# Patient Record
Sex: FEMALE | Race: WHITE | NOT HISPANIC OR LATINO | Employment: FULL TIME | URBAN - METROPOLITAN AREA
[De-identification: names, ages, dates, MRNs, and addresses within clinical notes are randomized per-mention and may not be internally consistent; named-entity substitution may affect disease eponyms.]

---

## 2021-12-16 ENCOUNTER — OFFICE VISIT (OUTPATIENT)
Dept: OBSTETRICS AND GYNECOLOGY | Facility: CLINIC | Age: 36
End: 2021-12-16

## 2021-12-16 VITALS
WEIGHT: 158 LBS | HEIGHT: 64 IN | BODY MASS INDEX: 26.98 KG/M2 | SYSTOLIC BLOOD PRESSURE: 110 MMHG | HEART RATE: 71 BPM | DIASTOLIC BLOOD PRESSURE: 65 MMHG

## 2021-12-16 DIAGNOSIS — Z30.432 ENCOUNTER FOR REMOVAL OF INTRAUTERINE CONTRACEPTIVE DEVICE (IUD): Primary | ICD-10-CM

## 2021-12-16 PROCEDURE — 58301 REMOVE INTRAUTERINE DEVICE: CPT | Performed by: NURSE PRACTITIONER

## 2021-12-16 NOTE — PROGRESS NOTES
IUD Removal Procedure Note    Procedures    Type of IUD:  Mirena   Date of insertion:  unknown  Reason for removal:  Desires pregnancy    Procedure Time Out Documentation    Procedure Details  IUD strings visible:  no  Removal:  Attempted to visualize strings and removed from cervix with cotton swabs unsuccessfully, attempted to grasp strings with Jackie forceps x4.  Unable to grasp strings.  Discussed with patient will need to see physician to facilitate IUD removal.  Patient verbalizes understanding and is agreeable.    All appropriate instructions regarding removal were reviewed.    Tolerated well  No apparent complications  Post procedure diagnosis : Failed IUD removal     Plans for contraception:  no method    The patient was advised to call for any fever or for prolonged or severe pain or bleeding. She was advised to use motrin as needed for mild to moderate pain.     Grant Vallejo, APRN  12/16/2021

## 2022-01-11 ENCOUNTER — OFFICE VISIT (OUTPATIENT)
Dept: OBSTETRICS AND GYNECOLOGY | Facility: CLINIC | Age: 37
End: 2022-01-11

## 2022-01-11 VITALS
WEIGHT: 158.6 LBS | SYSTOLIC BLOOD PRESSURE: 116 MMHG | BODY MASS INDEX: 27.08 KG/M2 | HEART RATE: 104 BPM | DIASTOLIC BLOOD PRESSURE: 74 MMHG | HEIGHT: 64 IN

## 2022-01-11 DIAGNOSIS — Z30.432 ENCOUNTER FOR IUD REMOVAL: Primary | ICD-10-CM

## 2022-01-11 PROCEDURE — 58301 REMOVE INTRAUTERINE DEVICE: CPT | Performed by: OBSTETRICS & GYNECOLOGY

## 2022-01-11 RX ORDER — LORATADINE 10 MG/1
10 CAPSULE, LIQUID FILLED ORAL
COMMUNITY
End: 2022-11-15

## 2022-01-11 NOTE — ASSESSMENT & PLAN NOTE
Tylenol or Motrin for pelvic cramping  Return warnings given  Recommend prenatal vitamin with multivitamin folic acid if not preventing pregnancy

## 2022-01-11 NOTE — PROGRESS NOTES
"IUD Removal      CC: Presents for IUD removal     I reviewed the procedure in detail.  She understand the potential risks include, but are not limited to, pain, bleeding, and infection.  Her questions have been answered.    Subjective:  Desires IUD removal.  Provider at her last office visit was unable to retrieve the IUD.  No concerns.  Does not desire any further birth control.    Objective:  /74   Pulse 104   Ht 162.6 cm (64\")   Wt 71.9 kg (158 lb 9.6 oz)   LMP 01/11/2022   Breastfeeding No   BMI 27.22 kg/m²     Physical Exam  Vitals and nursing note reviewed. Exam conducted with a chaperone present.   Constitutional:       General: She is not in acute distress.     Appearance: Normal appearance. She is not ill-appearing.   Abdominal:      General: Abdomen is flat. There is no distension.      Palpations: Abdomen is soft. There is no mass.      Tenderness: There is no guarding or rebound.      Hernia: No hernia is present.   Genitourinary:     General: Normal vulva.      Exam position: Lithotomy position.      Pubic Area: No rash.       Labia:         Right: No rash, tenderness, lesion or injury.         Left: No rash, tenderness, lesion or injury.       Vagina: Normal.      Cervix: Normal.      Uterus: Normal.       Comments: IUD string seen just inside the external cervical os.  Musculoskeletal:      Right lower leg: No edema.      Left lower leg: No edema.   Neurological:      Mental Status: She is alert and oriented to person, place, and time.   Psychiatric:         Mood and Affect: Mood normal.         Behavior: Behavior normal.         Thought Content: Thought content normal.         IUD REMOVAL: Strings visualized, IUD removed intact.  Discarded.    ASSESSMENT AND PLAN:    Diagnoses and all orders for this visit:    1. Encounter for IUD removal (Primary)  Assessment & Plan:  Tylenol or Motrin for pelvic cramping  Return warnings given  Recommend prenatal vitamin with multivitamin folic acid if " not preventing pregnancy          Counseling:  She understand she can become pregnant immediately.  I recommend she keep track of menses and RTO if <q21d, >7d long, heavy or painful.    R/B/A/SE/efficacy of all BC options reviewed with respect to her individual medical history.   She has decided on None for BC.  If she desires pregnancy I have encouraged a healthy lifestyle and PNV.     PRECAUTIONS - She was advised to watch for fever, chills, vaginal discharge or odor.      Follow Up:  Return if symptoms worsen or fail to improve.        Dragan Jones MD  01/11/2022

## 2022-03-30 ENCOUNTER — INITIAL PRENATAL (OUTPATIENT)
Dept: OBSTETRICS AND GYNECOLOGY | Facility: CLINIC | Age: 37
End: 2022-03-30

## 2022-03-30 VITALS — SYSTOLIC BLOOD PRESSURE: 114 MMHG | BODY MASS INDEX: 27.81 KG/M2 | DIASTOLIC BLOOD PRESSURE: 82 MMHG | WEIGHT: 162 LBS

## 2022-03-30 DIAGNOSIS — Z32.01 PREGNANCY TEST PERFORMED, PREGNANCY CONFIRMED: Primary | ICD-10-CM

## 2022-03-30 DIAGNOSIS — Z34.80 SUPERVISION OF OTHER NORMAL PREGNANCY: ICD-10-CM

## 2022-03-30 PROBLEM — Z30.432 ENCOUNTER FOR IUD REMOVAL: Status: RESOLVED | Noted: 2022-01-11 | Resolved: 2022-03-30

## 2022-03-30 PROBLEM — Z98.891 HX OF CESAREAN SECTION: Status: ACTIVE | Noted: 2022-03-30

## 2022-03-30 PROBLEM — O09.521 MULTIGRAVIDA OF ADVANCED MATERNAL AGE IN FIRST TRIMESTER: Status: ACTIVE | Noted: 2022-03-30

## 2022-03-30 LAB
ABO GROUP BLD: NORMAL
B-HCG UR QL: POSITIVE
BASOPHILS # BLD AUTO: 0.02 10*3/MM3 (ref 0–0.2)
BASOPHILS NFR BLD AUTO: 0.2 % (ref 0–1.5)
BLD GP AB SCN SERPL QL: NEGATIVE
C TRACH RRNA CVX QL NAA+PROBE: NOT DETECTED
DEPRECATED RDW RBC AUTO: 44.8 FL (ref 37–54)
EOSINOPHIL # BLD AUTO: 0.09 10*3/MM3 (ref 0–0.4)
EOSINOPHIL NFR BLD AUTO: 1 % (ref 0.3–6.2)
ERYTHROCYTE [DISTWIDTH] IN BLOOD BY AUTOMATED COUNT: 12.2 % (ref 12.3–15.4)
EXPIRATION DATE: ABNORMAL
GLUCOSE UR STRIP-MCNC: NEGATIVE MG/DL
HBV SURFACE AG SERPL QL IA: NORMAL
HCT VFR BLD AUTO: 40.3 % (ref 34–46.6)
HCV AB SER DONR QL: NORMAL
HGB BLD-MCNC: 13.8 G/DL (ref 12–15.9)
HIV1+2 AB SER QL: NORMAL
IMM GRANULOCYTES # BLD AUTO: 0.04 10*3/MM3 (ref 0–0.05)
IMM GRANULOCYTES NFR BLD AUTO: 0.4 % (ref 0–0.5)
INTERNAL NEGATIVE CONTROL: ABNORMAL
INTERNAL POSITIVE CONTROL: ABNORMAL
LYMPHOCYTES # BLD AUTO: 1.95 10*3/MM3 (ref 0.7–3.1)
LYMPHOCYTES NFR BLD AUTO: 21.2 % (ref 19.6–45.3)
Lab: ABNORMAL
MCH RBC QN AUTO: 33.8 PG (ref 26.6–33)
MCHC RBC AUTO-ENTMCNC: 34.2 G/DL (ref 31.5–35.7)
MCV RBC AUTO: 98.8 FL (ref 79–97)
MONOCYTES # BLD AUTO: 0.48 10*3/MM3 (ref 0.1–0.9)
MONOCYTES NFR BLD AUTO: 5.2 % (ref 5–12)
N GONORRHOEA RRNA SPEC QL NAA+PROBE: NOT DETECTED
NEUTROPHILS NFR BLD AUTO: 6.6 10*3/MM3 (ref 1.7–7)
NEUTROPHILS NFR BLD AUTO: 72 % (ref 42.7–76)
NRBC BLD AUTO-RTO: 0 /100 WBC (ref 0–0.2)
PLATELET # BLD AUTO: 323 10*3/MM3 (ref 140–450)
PMV BLD AUTO: 9.6 FL (ref 6–12)
PROT UR STRIP-MCNC: NEGATIVE MG/DL
RBC # BLD AUTO: 4.08 10*6/MM3 (ref 3.77–5.28)
RH BLD: POSITIVE
WBC NRBC COR # BLD: 9.18 10*3/MM3 (ref 3.4–10.8)

## 2022-03-30 PROCEDURE — 0501F PRENATAL FLOW SHEET: CPT | Performed by: STUDENT IN AN ORGANIZED HEALTH CARE EDUCATION/TRAINING PROGRAM

## 2022-03-30 PROCEDURE — 86803 HEPATITIS C AB TEST: CPT | Performed by: STUDENT IN AN ORGANIZED HEALTH CARE EDUCATION/TRAINING PROGRAM

## 2022-03-30 PROCEDURE — 87591 N.GONORRHOEAE DNA AMP PROB: CPT | Performed by: STUDENT IN AN ORGANIZED HEALTH CARE EDUCATION/TRAINING PROGRAM

## 2022-03-30 PROCEDURE — 87086 URINE CULTURE/COLONY COUNT: CPT | Performed by: STUDENT IN AN ORGANIZED HEALTH CARE EDUCATION/TRAINING PROGRAM

## 2022-03-30 PROCEDURE — 87491 CHLMYD TRACH DNA AMP PROBE: CPT | Performed by: STUDENT IN AN ORGANIZED HEALTH CARE EDUCATION/TRAINING PROGRAM

## 2022-03-30 PROCEDURE — G0432 EIA HIV-1/HIV-2 SCREEN: HCPCS | Performed by: STUDENT IN AN ORGANIZED HEALTH CARE EDUCATION/TRAINING PROGRAM

## 2022-03-30 PROCEDURE — 81025 URINE PREGNANCY TEST: CPT | Performed by: STUDENT IN AN ORGANIZED HEALTH CARE EDUCATION/TRAINING PROGRAM

## 2022-03-30 PROCEDURE — 87624 HPV HI-RISK TYP POOLED RSLT: CPT | Performed by: STUDENT IN AN ORGANIZED HEALTH CARE EDUCATION/TRAINING PROGRAM

## 2022-03-30 PROCEDURE — 83020 HEMOGLOBIN ELECTROPHORESIS: CPT | Performed by: STUDENT IN AN ORGANIZED HEALTH CARE EDUCATION/TRAINING PROGRAM

## 2022-03-30 PROCEDURE — G0123 SCREEN CERV/VAG THIN LAYER: HCPCS | Performed by: STUDENT IN AN ORGANIZED HEALTH CARE EDUCATION/TRAINING PROGRAM

## 2022-03-30 RX ORDER — PNV NO.95/FERROUS FUM/FOLIC AC 28MG-0.8MG
1 TABLET ORAL DAILY
Qty: 30 TABLET | Refills: 11 | Status: SHIPPED | OUTPATIENT
Start: 2022-03-30

## 2022-03-30 NOTE — PROGRESS NOTES
OB Initial Visit    CC- Here for care of current pregnancy     KAMARI Finalized: Unable to finalize dates, needs dating U/S    Subjective:  36 y.o.  presenting for her first obstetrical visit.    LMP: Patient's last menstrual period was 2022. unsure    COMPLAINS OF: fatigue    Lissa Guardado is a 36 y.o.  11w1d first prenatal appointment. She denies any vaginal bleeding, reports some occassional L sided tenderness. Breast, morning sickness has resolved.  No other acute complaints. No significant medical history. History of abnormal pap smear; no surgery;  Hx C/S fetal intolerance. No STI hx. Taking Prenatal vitamin       Reviewed and updated:  OBHx, GYNHx (STDs), PMHx, Medications, Allergies, PSHx, Social Hx, Preventative Hx (PAP), Hx of abuse/safe environment, Vaccine Hx including hx of chickenpox or vaccine, Genetic Hx (pt, FOB, both families).        Objective:  /82   Wt 73.5 kg (162 lb)   LMP 2022   BMI 27.81 kg/m²   General- NAD, alert and oriented, appropriate  Psych- Normal mood, good memory  Neck- No masses, no thyroid enlargement  CV- Regular rhythm, no murnurs  Resp- CTA to bases, no wheezes  Abdomen- Soft, non distended, non tender, no masses, well healed pfannenstiel incision   External genitalia- Normal, no lesions  Urethra- Normal, no masses, non tender  Vagina- Normal, no discharge  Bladder- Normal, no masses, non tender  Cvx- Normal, no lesions, no discharge, no CMT  Uterus- Normal shape and consistency, non tender  Adnexa- Normal, no mass, non tender    Lymphatic- No palpable neck, axillary, or groin nodes  Ext- No edema, no cyanosis    Skin- No lesions, no rashes, no acanthosis nigricans      Assessment and Plan:  Diagnoses and all orders for this visit:    1. Pregnancy test performed, pregnancy confirmed (Primary)  -     POC Pregnancy, Urine    2. Supervision of other normal pregnancy  -     POC Urinalysis Dipstick  -     Urine Culture - Urine, Urine, Clean Catch  -      IgP, Aptima HPV  -     OB Panel With HIV; Future  -     Hemoglobinopathy Fractionation Cascade; Future  -     Chlamydia trachomatis, Neisseria gonorrhoeae, PCR - Swab, Cervix  -     US Ob < 14 Weeks Single or First Gestation; Future  -     Prenatal Vit-Fe Fumarate-FA (Prenatal Vitamin) 27-0.8 MG tablet; Take 1 tablet by mouth Daily.  Dispense: 30 tablet; Refill: 11            11w1d    Genetic Screening: Considering   CF  NIPS    Vaccines: Recommend COVID vaccine, R/B discussed    Counseling: Nutrition discussed, calories, activity/exercise in pregnancy  Discussed dietary restrictions/safety food preparation in pregnancy  Reviewed what to expect prenatal visits, office providers  Appropriate trimester precautions provided, N/V, vag bleeding, cramping  Questions answered    Labs: Prenatal labs, cultures, and PAP performed (prn)    Return in about 4 weeks (around 4/27/2022) for Next scheduled follow up.      Wali Flowers MD  03/30/2022

## 2022-03-31 LAB
BACTERIA SPEC AEROBE CULT: NO GROWTH
RPR SER QL: NORMAL

## 2022-04-01 LAB
HGB A MFR BLD ELPH: 95.9 % (ref 96.4–98.8)
HGB A2 MFR BLD ELPH: 2.6 % (ref 1.8–3.2)
HGB F MFR BLD ELPH: 1.5 % (ref 0–2)
HGB FRACT BLD-IMP: ABNORMAL
HGB S MFR BLD ELPH: 0 %
RUBV IGG SERPL IA-ACNC: 4.96 INDEX

## 2022-04-06 LAB
CYTOLOGIST CVX/VAG CYTO: ABNORMAL
CYTOLOGY CVX/VAG DOC CYTO: ABNORMAL
CYTOLOGY CVX/VAG DOC THIN PREP: ABNORMAL
DX ICD CODE: ABNORMAL
HIV 1 & 2 AB SER-IMP: ABNORMAL
HPV I/H RISK 4 DNA CVX QL PROBE+SIG AMP: POSITIVE
OTHER STN SPEC: ABNORMAL
STAT OF ADQ CVX/VAG CYTO-IMP: ABNORMAL

## 2022-04-12 ENCOUNTER — TELEPHONE (OUTPATIENT)
Dept: OBSTETRICS AND GYNECOLOGY | Facility: CLINIC | Age: 37
End: 2022-04-12

## 2022-04-12 NOTE — TELEPHONE ENCOUNTER
Pt called stating @ initial ob appt it was discussed that the NIPS testing would be recommended due to her being AMA. [facility] will not allow her to get the NIPS testing with out a preauthorization, which she understands we do not do those,she is wanting to know if we can send over a letter to her PCP on [place] stating that the NIPS testing would be recommended due to her being AMA.   Please advise.

## 2022-04-13 NOTE — TELEPHONE ENCOUNTER
Yes a letter can be sent to PCP stating that patient is at increased risk for aneuploidy based on age > 35.

## 2022-04-28 ENCOUNTER — ROUTINE PRENATAL (OUTPATIENT)
Dept: OBSTETRICS AND GYNECOLOGY | Facility: CLINIC | Age: 37
End: 2022-04-28

## 2022-04-28 VITALS — BODY MASS INDEX: 28.15 KG/M2 | WEIGHT: 164 LBS | DIASTOLIC BLOOD PRESSURE: 63 MMHG | SYSTOLIC BLOOD PRESSURE: 120 MMHG

## 2022-04-28 DIAGNOSIS — O09.521 MULTIGRAVIDA OF ADVANCED MATERNAL AGE IN FIRST TRIMESTER: ICD-10-CM

## 2022-04-28 DIAGNOSIS — Z34.80 SUPERVISION OF OTHER NORMAL PREGNANCY, ANTEPARTUM: ICD-10-CM

## 2022-04-28 DIAGNOSIS — Z98.891 HX OF CESAREAN SECTION: Primary | ICD-10-CM

## 2022-04-28 DIAGNOSIS — Z3A.15 15 WEEKS GESTATION OF PREGNANCY: ICD-10-CM

## 2022-04-28 LAB
GLUCOSE UR STRIP-MCNC: NEGATIVE MG/DL
LEUKOCYTE EST, POC: NEGATIVE
NITRITE UR-MCNC: NEGATIVE MG/ML
PROT UR STRIP-MCNC: NEGATIVE MG/DL

## 2022-04-28 PROCEDURE — 82105 ALPHA-FETOPROTEIN SERUM: CPT | Performed by: NURSE PRACTITIONER

## 2022-04-28 PROCEDURE — 0502F SUBSEQUENT PRENATAL CARE: CPT | Performed by: NURSE PRACTITIONER

## 2022-04-28 PROCEDURE — 36415 COLL VENOUS BLD VENIPUNCTURE: CPT | Performed by: NURSE PRACTITIONER

## 2022-04-28 NOTE — PROGRESS NOTES
OB FOLLOW UP        Chief Complaint   Patient presents with   • Routine Prenatal Visit     15 week pregnancy follow up        Subjective:   • No complaints  • Still has concerns about continued pregnancy viability due to hx of miscarriage    Objective:  /63   Wt 74.4 kg (164 lb)   LMP 2022   BMI 28.15 kg/m²   Uterine Size: size equals dates, below umbilicus  FHT: 110-160 BPM    See OB flow for LE edema, cvx exam if performed, and Upro/Uglu    Assessment and Plan:  15w2d  Reassuring pregnancy progress.  Questions answered.  Diagnoses and all orders for this visit:    1. Hx of  section (Primary)  Overview:  3/30/2022 Fetal intolerance       2. Supervision of other normal pregnancy, antepartum  Overview:  Initial visit:  3/30/2022   • PNL:  • PAP/GC/CT/Urine culture:  • Blood type:  • Hx of HSV?:  No     Genetic testing:    Considering CF and NIPS     Vaccinations:  • COVID: 3/30/2022  Vaccinated and boosted      24-28 weeks:  • 1hr GCT:  • Hct/Plt:  • Tdap Rx  • Rhogam indicated:      Third Trimester:  • GBS  • Breast pump:    Ultrasound:  • Dating:  3/30/2022   • Anatomy:   • Follow up:     PLAN:          Orders:  -     POC Urinalysis Dipstick  -     INVITAE NIPS  -     Alpha Fetoprotein, Maternal  -     US Ob Detail Fetal Anatomy Single or First Gestation; Future    3. Multigravida of advanced maternal age in first trimester    4. 15 weeks gestation of pregnancy    Counseling:    • Second trimester precautions  • encouraged to seek counseling/therapy for continued anxiety, patient will consider  • Continue PNV.  Importance of healthy eating and exercise.    Return in about 4 weeks (around 2022) for OB follow up.            Grant Vallejo, APRN  2022    [facility]  [facility]  [address] [address]  Dept: [phone]  Loc: [phone]

## 2022-05-05 ENCOUNTER — TELEPHONE (OUTPATIENT)
Dept: OBSTETRICS AND GYNECOLOGY | Facility: CLINIC | Age: 37
End: 2022-05-05

## 2022-05-05 NOTE — TELEPHONE ENCOUNTER
Discussed results with patient. Patient has a log in to nips portal and will access that with  for gender results.

## 2022-05-06 LAB
AFP INTERP SERPL-IMP: NORMAL
AFP INTERP SERPL-IMP: NORMAL
AFP MOM SERPL: 1.27
AFP SERPL-MCNC: 38 NG/ML
AGE AT DELIVERY: 38.4 YR
GA METHOD: NORMAL
GA: 15.3 WEEKS
IDDM PATIENT QL: NO
LABORATORY COMMENT REPORT: NORMAL
MULTIPLE PREGNANCY: NO
NEURAL TUBE DEFECT RISK FETUS: 5251 %
RESULT: NORMAL

## 2022-05-31 ENCOUNTER — HOSPITAL ENCOUNTER (OUTPATIENT)
Dept: ULTRASOUND IMAGING | Facility: HOSPITAL | Age: 37
Discharge: HOME | End: 2022-05-31
Admitting: NURSE PRACTITIONER

## 2022-05-31 DIAGNOSIS — Z34.80 SUPERVISION OF OTHER NORMAL PREGNANCY, ANTEPARTUM: ICD-10-CM

## 2022-05-31 PROCEDURE — 76811 OB US DETAILED SNGL FETUS: CPT

## 2022-06-03 ENCOUNTER — ROUTINE PRENATAL (OUTPATIENT)
Dept: OBSTETRICS AND GYNECOLOGY | Facility: CLINIC | Age: 37
End: 2022-06-03

## 2022-06-03 VITALS — WEIGHT: 172 LBS | SYSTOLIC BLOOD PRESSURE: 119 MMHG | BODY MASS INDEX: 29.52 KG/M2 | DIASTOLIC BLOOD PRESSURE: 74 MMHG

## 2022-06-03 DIAGNOSIS — Z34.80 SUPERVISION OF OTHER NORMAL PREGNANCY, ANTEPARTUM: Primary | ICD-10-CM

## 2022-06-03 DIAGNOSIS — O09.521 MULTIGRAVIDA OF ADVANCED MATERNAL AGE IN FIRST TRIMESTER: ICD-10-CM

## 2022-06-03 DIAGNOSIS — Z98.891 HX OF CESAREAN SECTION: ICD-10-CM

## 2022-06-03 PROBLEM — U07.1 COVID-19 VIRUS DETECTED: Status: ACTIVE | Noted: 2022-06-03

## 2022-06-03 LAB
GLUCOSE UR STRIP-MCNC: NEGATIVE MG/DL
PROT UR STRIP-MCNC: NEGATIVE MG/DL

## 2022-06-03 PROCEDURE — 0502F SUBSEQUENT PRENATAL CARE: CPT | Performed by: STUDENT IN AN ORGANIZED HEALTH CARE EDUCATION/TRAINING PROGRAM

## 2022-06-03 RX ORDER — ASPIRIN 81 MG/1
81 TABLET, CHEWABLE ORAL DAILY
Status: ON HOLD | COMMUNITY
End: 2022-10-12

## 2022-06-03 NOTE — PROGRESS NOTES
OB FOLLOW UP  Complaint   Chief Complaint   Patient presents with   • Routine Prenatal Visit            Lissa Guardado is a 37 y.o.  20w3d patient being seen today for her obstetrical follow up visit. Patient denies decreased fetal movement, contractions, loss of fluid or vaginal bleeding.  Did get diagnosed with COVID in middle of May.  Had sinus-like infection.  Reports no concerns today.  Has started taking baby aspirin.    Her prenatal care is complicated by (and status) :    Patient Active Problem List   Diagnosis   • Hx of  section   • Supervision of other normal pregnancy, antepartum   • Multigravida of advanced maternal age in first trimester   • COVID-19 virus detected       All other systems reviewed and are negative.     The additional following portions of the patient's history were reviewed and updated as appropriate: allergies, current medications, past family history, past medical history, past social history, past surgical history and problem list.      EXAM:     Vital signs: /74   Wt 78 kg (172 lb)   LMP 2022   BMI 29.52 kg/m²   Appearance/psychiatric: To be in no distress  Constitutional: The patient is well nourished.  Cardiovascular: She does not have edema.  Respiratory: Respiratory effort is normal.  Gastrointestinal: Abdomen is soft, gravid, nontender, no rashes, heart tones are present, fundal height is size equals dates    Pelvic Exam: No    Urine glucose/protein: See prenatal flowsheet       Assessment and Plan    Problem List Items Addressed This Visit        Gravid and     Hx of  section    Overview     3/30/2022 Fetal intolerance            Supervision of other normal pregnancy, antepartum - Primary    Overview     Initial visit:  3/30/2022   • PNL:  • PAP/GC/CT/Urine culture:  • Blood type:  • Hx of HSV?:  No     Genetic testing:    Considering CF and NIPS   AFP negative    Vaccinations:  • COVID: 3/30/2022  Vaccinated and boosted      24-28  weeks:  • 1hr GCT:  • Hct/Plt:  • Tdap Rx      Third Trimester:  • GBS  • Breast pump:    Ultrasound:  • Dating:  3/30/2022   • Anatomy: 5/31/22 normal anatomy, posterior placenta  • Follow up:     PLAN:                 Relevant Orders    POC Urinalysis Dipstick (Completed)    Multigravida of advanced maternal age in first trimester          Impression  1. Pregnancy at 20w3d  2. Fetal status reassuring.   3. Activity and Exercise discussed.    Plan  1.  Continue with daily aspirin for history of COVID.  Discussed NSTs at 30 to 34 weeks for history of COVID as well as advanced maternal age  2.  Glucose tolerance testing hemoglobin Macker to next appointment in 4 weeks  3.  Follow-up 4 weeks      Patient was counseled to the following pregnancy precautions:  • Decreased fetal movement, if concern for decreased fetal movement please perform fetal kick counts you are looking for 10 movements in 2 hours.  If concern for fetal movement and not meeting that criteria, please present to triage for evaluation.  • Contractions occurring every 5 minutes for over an hour, lasting 30 to 60 seconds and progressively causing more discomfort, please seek medical attention to rule out labor  • If you believe that your water is broken, place a sanitary pad.  If pad fills in short period of time i.e. less than 5 minutes, take off pad placed another pad.  If this is saturated please present for rule out rupture of membranes  • Vaginal bleeding can be normal in pregnancy, this usually takes a form of spotting.  If having heavier bleeding like a menstrual period please present for evaluation; especially in light of severe abdominal pain this could represent a placental abruption.  • Keep all scheduled appointments as recommended.        Wali Flowers MD  06/03/2022

## 2022-06-30 ENCOUNTER — ROUTINE PRENATAL (OUTPATIENT)
Dept: OBSTETRICS AND GYNECOLOGY | Facility: CLINIC | Age: 37
End: 2022-06-30

## 2022-06-30 VITALS — SYSTOLIC BLOOD PRESSURE: 111 MMHG | WEIGHT: 175 LBS | BODY MASS INDEX: 30.04 KG/M2 | DIASTOLIC BLOOD PRESSURE: 69 MMHG

## 2022-06-30 DIAGNOSIS — Z34.80 SUPERVISION OF OTHER NORMAL PREGNANCY, ANTEPARTUM: Primary | ICD-10-CM

## 2022-06-30 DIAGNOSIS — O09.522 MULTIGRAVIDA OF ADVANCED MATERNAL AGE IN SECOND TRIMESTER: ICD-10-CM

## 2022-06-30 DIAGNOSIS — Z98.891 HX OF CESAREAN SECTION: ICD-10-CM

## 2022-06-30 DIAGNOSIS — U07.1 COVID-19 VIRUS DETECTED: ICD-10-CM

## 2022-06-30 LAB
GLUCOSE 1H P GLC SERPL-MCNC: 130 MG/DL (ref 65–139)
GLUCOSE UR STRIP-MCNC: NEGATIVE MG/DL
PROT UR STRIP-MCNC: NEGATIVE MG/DL

## 2022-06-30 PROCEDURE — 85027 COMPLETE CBC AUTOMATED: CPT | Performed by: STUDENT IN AN ORGANIZED HEALTH CARE EDUCATION/TRAINING PROGRAM

## 2022-06-30 PROCEDURE — 0502F SUBSEQUENT PRENATAL CARE: CPT | Performed by: STUDENT IN AN ORGANIZED HEALTH CARE EDUCATION/TRAINING PROGRAM

## 2022-06-30 PROCEDURE — 82950 GLUCOSE TEST: CPT | Performed by: STUDENT IN AN ORGANIZED HEALTH CARE EDUCATION/TRAINING PROGRAM

## 2022-06-30 NOTE — PROGRESS NOTES
OB FOLLOW UP  Complaint   Chief Complaint   Patient presents with   • Routine Prenatal Visit            Lissa Guardado is a 37 y.o.  24w2d patient being seen today for her obstetrical follow up visit. Patient denies decreased fetal movement, contractions, loss of fluid or vaginal bleeding.  No acute complaints.  Compliant with prenatal vitamin and baby aspirin.     Her prenatal care is complicated by (and status) :    Patient Active Problem List   Diagnosis   • Hx of  section   • Supervision of other normal pregnancy, antepartum   • Multigravida of advanced maternal age in second trimester   • COVID-19 virus detected       All other systems reviewed and are negative.     The additional following portions of the patient's history were reviewed and updated as appropriate: allergies, current medications, past family history, past medical history, past social history, past surgical history and problem list.      EXAM:     Vital signs: /69   Wt 79.4 kg (175 lb)   LMP 2022   BMI 30.04 kg/m²   Appearance/psychiatric: To be in no distress  Constitutional: The patient is well nourished.  Cardiovascular: She does not have edema.  Respiratory: Respiratory effort is normal.  Gastrointestinal: Abdomen is soft, gravid, nontender, no rashes, heart tones are present, fundal height is size equals dates    Pelvic Exam: No    Urine glucose/protein: See prenatal flowsheet       Assessment and Plan    Problem List Items Addressed This Visit        Gravid and     Hx of  section    Overview     3/30/2022 Fetal intolerance            Supervision of other normal pregnancy, antepartum - Primary    Overview     Initial visit:  3/30/2022   • PNL:  • PAP/GC/CT/Urine culture:  • Blood type:  • Hx of HSV?:  No     Genetic testing:    Considering CF and NIPS   AFP negative    Vaccinations:  • COVID: 3/30/2022  Vaccinated and boosted      24-28 weeks:  • 1hr GCT: 2022   • Hct/Plt: 2022   • Tdap  Rx      Third Trimester:  • GBS  • Breast pump:    Ultrasound:  • Dating:  3/30/2022   • Anatomy: 5/31/22 normal anatomy, posterior placenta  • Follow up:     PLAN:                 Relevant Orders    Gestational Diabetes Screen 1 Hour    CBC (No Diff)    POC Urinalysis Dipstick (Completed)    Multigravida of advanced maternal age in second trimester       Other    COVID-19 virus detected    Overview     6/3/2022 ASA                  Impression  1. Pregnancy at 24w2d  2. Fetal status reassuring.   3. Activity and Exercise discussed.    Plan  1.  Glucose tolerance testing and CBC today  2.  Continue with baby aspirin prenatal vitamin  3.  Follow-up 4 weeks      Patient was counseled to the following pregnancy precautions:  • Decreased fetal movement, if concern for decreased fetal movement please perform fetal kick counts you are looking for 10 movements in 2 hours.  If concern for fetal movement and not meeting that criteria, please present to triage for evaluation.  • Contractions occurring every 5 minutes for over an hour, lasting 30 to 60 seconds and progressively causing more discomfort, please seek medical attention to rule out labor  • If you believe that your water is broken, place a sanitary pad.  If pad fills in short period of time i.e. less than 5 minutes, take off pad placed another pad.  If this is saturated please present for rule out rupture of membranes  • Vaginal bleeding can be normal in pregnancy, this usually takes a form of spotting.  If having heavier bleeding like a menstrual period please present for evaluation; especially in light of severe abdominal pain this could represent a placental abruption.  • Keep all scheduled appointments as recommended.        Wali Flowers MD  06/30/2022

## 2022-07-01 ENCOUNTER — TELEPHONE (OUTPATIENT)
Dept: OBSTETRICS AND GYNECOLOGY | Facility: CLINIC | Age: 37
End: 2022-07-01

## 2022-07-01 LAB
DEPRECATED RDW RBC AUTO: 45.2 FL (ref 37–54)
ERYTHROCYTE [DISTWIDTH] IN BLOOD BY AUTOMATED COUNT: 12.2 % (ref 12.3–15.4)
HCT VFR BLD AUTO: 36 % (ref 34–46.6)
HGB BLD-MCNC: 12 G/DL (ref 12–15.9)
MCH RBC QN AUTO: 33.6 PG (ref 26.6–33)
MCHC RBC AUTO-ENTMCNC: 33.3 G/DL (ref 31.5–35.7)
MCV RBC AUTO: 100.8 FL (ref 79–97)
PLATELET # BLD AUTO: 283 10*3/MM3 (ref 140–450)
PMV BLD AUTO: 9.3 FL (ref 6–12)
RBC # BLD AUTO: 3.57 10*6/MM3 (ref 3.77–5.28)
WBC NRBC COR # BLD: 9.28 10*3/MM3 (ref 3.4–10.8)

## 2022-07-01 NOTE — TELEPHONE ENCOUNTER
----- Message from Wali Flowers MD sent at 7/1/2022  8:44 AM EDT -----  H&H normal; , elevated for office guidelines. Recommend 3 hour diagnostic testing.

## 2022-07-07 ENCOUNTER — LAB (OUTPATIENT)
Dept: LAB | Facility: HOSPITAL | Age: 37
End: 2022-07-07

## 2022-07-07 DIAGNOSIS — Z34.80 SUPERVISION OF OTHER NORMAL PREGNANCY, ANTEPARTUM: ICD-10-CM

## 2022-07-07 LAB
GLUCOSE BLDC GLUCOMTR-MCNC: 79 MG/DL (ref 70–99)
GLUCOSE P FAST SERPL-MCNC: 84 MG/DL (ref 65–94)
GTT GEST 2H PNL UR+SERPL: 126 MG/DL (ref 65–179)
GTT GEST 3H PNL SERPL: 135 MG/DL (ref 65–154)
GTT GEST 3H PNL SERPL: 82 MG/DL (ref 65–139)

## 2022-07-07 PROCEDURE — 36415 COLL VENOUS BLD VENIPUNCTURE: CPT

## 2022-07-07 PROCEDURE — 82952 GTT-ADDED SAMPLES: CPT

## 2022-07-07 PROCEDURE — 82951 GLUCOSE TOLERANCE TEST (GTT): CPT

## 2022-07-27 ENCOUNTER — ROUTINE PRENATAL (OUTPATIENT)
Dept: OBSTETRICS AND GYNECOLOGY | Facility: CLINIC | Age: 37
End: 2022-07-27

## 2022-07-27 VITALS — SYSTOLIC BLOOD PRESSURE: 109 MMHG | BODY MASS INDEX: 30.73 KG/M2 | WEIGHT: 179 LBS | DIASTOLIC BLOOD PRESSURE: 69 MMHG

## 2022-07-27 DIAGNOSIS — U07.1 COVID-19 VIRUS DETECTED: ICD-10-CM

## 2022-07-27 DIAGNOSIS — O09.523 MULTIGRAVIDA OF ADVANCED MATERNAL AGE IN THIRD TRIMESTER: ICD-10-CM

## 2022-07-27 DIAGNOSIS — Z34.80 SUPERVISION OF OTHER NORMAL PREGNANCY, ANTEPARTUM: ICD-10-CM

## 2022-07-27 DIAGNOSIS — Z98.891 HX OF CESAREAN SECTION: Primary | ICD-10-CM

## 2022-07-27 LAB
GLUCOSE UR STRIP-MCNC: NEGATIVE MG/DL
PROT UR STRIP-MCNC: NEGATIVE MG/DL

## 2022-07-27 PROCEDURE — 0502F SUBSEQUENT PRENATAL CARE: CPT | Performed by: STUDENT IN AN ORGANIZED HEALTH CARE EDUCATION/TRAINING PROGRAM

## 2022-07-27 NOTE — PROGRESS NOTES
OB FOLLOW UP  Complaint   Chief Complaint   Patient presents with   • Routine Prenatal Visit            Lissa Guardado is a 37 y.o.  28w1d patient being seen today for her obstetrical follow up visit. Patient denies decreased fetal movement, contractions, loss of fluid or vaginal bleeding. No acute complaints. Compliant with PNV and baby ASA    Her prenatal care is complicated by (and status) :    Patient Active Problem List   Diagnosis   • Hx of  section   • Supervision of other normal pregnancy, antepartum   • Multigravida of advanced maternal age in third trimester   • COVID-19 virus detected       All other systems reviewed and are negative.     The additional following portions of the patient's history were reviewed and updated as appropriate: allergies, current medications, past family history, past medical history, past social history, past surgical history and problem list.      EXAM:     Vital signs: /69   Wt 81.2 kg (179 lb)   LMP 2022   BMI 30.73 kg/m²   Appearance/psychiatric: To be in no distress  Constitutional: The patient is well nourished.  Cardiovascular: She does not have edema.  Respiratory: Respiratory effort is normal.  Gastrointestinal: Abdomen is soft, gravid, nontender, no rashes, heart tones are present, fundal height is size equals dates    Pelvic Exam: No    Urine glucose/protein: See prenatal flowsheet       Assessment and Plan    Problem List Items Addressed This Visit        Gravid and     Hx of  section - Primary    Overview     3/30/2022 Fetal intolerance          Supervision of other normal pregnancy, antepartum    Overview     Initial visit:  3/30/2022   • PNL:  • PAP/GC/CT/Urine culture:  • Blood type:  • Hx of HSV?:  No     Genetic testing:    Considering CF and NIPS   AFP negative    Vaccinations:  • COVID: 3/30/2022  Vaccinated and boosted      24-28 weeks:  • 1hr GCT: 2022   • Hct/Plt: 2022   • Tdap Rx      Third  Trimester:  • GBS  • Breast pump:    Ultrasound:  • Dating:  3/30/2022   • Anatomy: 5/31/22 normal anatomy, posterior placenta  • Follow up:     PLAN:               Relevant Orders    POC Urinalysis Dipstick (Completed)    Multigravida of advanced maternal age in third trimester       Other    COVID-19 virus detected    Overview     6/3/2022 ASA                Impression  1. Pregnancy at 28w1d  2. Fetal status reassuring.   3. Activity and Exercise discussed.    Plan  1. Follow up 2 weeks  2. Continue baby ASA for hx of AMA       Patient was counseled to the following pregnancy precautions:  • Decreased fetal movement, if concern for decreased fetal movement please perform fetal kick counts you are looking for 10 movements in 2 hours.  If concern for fetal movement and not meeting that criteria, please present to triage for evaluation.  • Contractions occurring every 5 minutes for over an hour, lasting 30 to 60 seconds and progressively causing more discomfort, please seek medical attention to rule out labor  • If you believe that your water is broken, place a sanitary pad.  If pad fills in short period of time i.e. less than 5 minutes, take off pad placed another pad.  If this is saturated please present for rule out rupture of membranes  • Vaginal bleeding can be normal in pregnancy, this usually takes a form of spotting.  If having heavier bleeding like a menstrual period please present for evaluation; especially in light of severe abdominal pain this could represent a placental abruption.  • Keep all scheduled appointments as recommended.        Wali Flowers MD  07/27/2022

## 2022-08-11 ENCOUNTER — ROUTINE PRENATAL (OUTPATIENT)
Dept: OBSTETRICS AND GYNECOLOGY | Facility: CLINIC | Age: 37
End: 2022-08-11

## 2022-08-11 VITALS — SYSTOLIC BLOOD PRESSURE: 106 MMHG | DIASTOLIC BLOOD PRESSURE: 66 MMHG | WEIGHT: 182 LBS | BODY MASS INDEX: 31.24 KG/M2

## 2022-08-11 DIAGNOSIS — U07.1 COVID-19 VIRUS DETECTED: ICD-10-CM

## 2022-08-11 DIAGNOSIS — Z34.80 SUPERVISION OF OTHER NORMAL PREGNANCY, ANTEPARTUM: Primary | ICD-10-CM

## 2022-08-11 DIAGNOSIS — Z98.891 HX OF CESAREAN SECTION: ICD-10-CM

## 2022-08-11 DIAGNOSIS — O09.523 MULTIGRAVIDA OF ADVANCED MATERNAL AGE IN THIRD TRIMESTER: ICD-10-CM

## 2022-08-11 LAB
GLUCOSE UR STRIP-MCNC: NEGATIVE MG/DL
PROT UR STRIP-MCNC: NEGATIVE MG/DL

## 2022-08-11 PROCEDURE — 0502F SUBSEQUENT PRENATAL CARE: CPT | Performed by: STUDENT IN AN ORGANIZED HEALTH CARE EDUCATION/TRAINING PROGRAM

## 2022-08-11 NOTE — PROGRESS NOTES
OB FOLLOW UP  Complaint   Chief Complaint   Patient presents with   • Routine Prenatal Visit            Lissa Guardado is a 37 y.o.  30w2d patient being seen today for her obstetrical follow up visit. Patient denies decreased fetal movement, contractions, loss of fluid or vaginal bleeding.  No acute complaints.     Her prenatal care is complicated by (and status) :    Patient Active Problem List   Diagnosis   • Hx of  section   • Supervision of other normal pregnancy, antepartum   • Multigravida of advanced maternal age in third trimester   • COVID-19 virus detected       All other systems reviewed and are negative.     The additional following portions of the patient's history were reviewed and updated as appropriate: allergies, current medications, past family history, past medical history, past social history, past surgical history and problem list.      EXAM:     Vital signs: Wt 82.6 kg (182 lb)   LMP 2022   BMI 31.24 kg/m²   Appearance/psychiatric: To be in no distress  Constitutional: The patient is well nourished.  Cardiovascular: She does not have edema.  Respiratory: Respiratory effort is normal.  Gastrointestinal: Abdomen is soft, gravid, nontender, no rashes, heart tones are present, fundal height is size equals dates    Pelvic Exam: No    Urine glucose/protein: See prenatal flowsheet       Assessment and Plan    Problem List Items Addressed This Visit        Gravid and     Hx of  section    Overview     3/30/2022 Fetal intolerance          Supervision of other normal pregnancy, antepartum - Primary    Overview     Initial visit:  3/30/2022   • PNL:  • PAP/GC/CT/Urine culture:  • Blood type:  • Hx of HSV?:  No     Genetic testing:    Considering CF and NIPS   AFP negative    Vaccinations:  • COVID: 3/30/2022  Vaccinated and boosted      24-28 weeks:  • 1hr GCT: 2022   • Hct/Plt: 2022   • Tdap Rx 2022 provided       Third Trimester:  • GBS  • Breast  pump:    Ultrasound:  • Dating:  3/30/2022   • Anatomy: 22 normal anatomy, posterior placenta  • Follow up:     PLAN:               Relevant Orders    POC Urinalysis Dipstick (Completed)    Multigravida of advanced maternal age in third trimester       Other    COVID-19 virus detected    Overview     6/3/2022 ASA; NST  34-36 weeks               Impression  1. Pregnancy at 30w2d  2. Fetal status reassuring.   3. Activity and Exercise discussed.    Plan  1.  Desires trial labor after , to get records from [place] for review  2.  Sterilization paperwork to be signed today potential sterilization procedure at time of procedure  3.  Continue with prenatal vitamin, aspirin, Claritin as needed  4.  NSTs beginning at 34 to 36 weeks for AMA and history of COVID  5.  Follow-up 2 weeks      Patient was counseled to the following pregnancy precautions:  • Decreased fetal movement, if concern for decreased fetal movement please perform fetal kick counts you are looking for 10 movements in 2 hours.  If concern for fetal movement and not meeting that criteria, please present to triage for evaluation.  • Contractions occurring every 5 minutes for over an hour, lasting 30 to 60 seconds and progressively causing more discomfort, please seek medical attention to rule out labor  • If you believe that your water is broken, place a sanitary pad.  If pad fills in short period of time i.e. less than 5 minutes, take off pad placed another pad.  If this is saturated please present for rule out rupture of membranes  • Vaginal bleeding can be normal in pregnancy, this usually takes a form of spotting.  If having heavier bleeding like a menstrual period please present for evaluation; especially in light of severe abdominal pain this could represent a placental abruption.  • Keep all scheduled appointments as recommended.        Wali Flowers MD  2022

## 2022-08-24 ENCOUNTER — ROUTINE PRENATAL (OUTPATIENT)
Dept: OBSTETRICS AND GYNECOLOGY | Facility: CLINIC | Age: 37
End: 2022-08-24

## 2022-08-24 VITALS — DIASTOLIC BLOOD PRESSURE: 70 MMHG | WEIGHT: 182 LBS | BODY MASS INDEX: 31.24 KG/M2 | SYSTOLIC BLOOD PRESSURE: 112 MMHG

## 2022-08-24 DIAGNOSIS — O09.523 MULTIGRAVIDA OF ADVANCED MATERNAL AGE IN THIRD TRIMESTER: ICD-10-CM

## 2022-08-24 DIAGNOSIS — Z34.80 SUPERVISION OF OTHER NORMAL PREGNANCY, ANTEPARTUM: Primary | ICD-10-CM

## 2022-08-24 DIAGNOSIS — Z98.891 HX OF CESAREAN SECTION: ICD-10-CM

## 2022-08-24 DIAGNOSIS — U07.1 COVID-19 VIRUS DETECTED: ICD-10-CM

## 2022-08-24 LAB
GLUCOSE UR STRIP-MCNC: NEGATIVE MG/DL
PROT UR STRIP-MCNC: NEGATIVE MG/DL

## 2022-08-24 PROCEDURE — 0502F SUBSEQUENT PRENATAL CARE: CPT | Performed by: STUDENT IN AN ORGANIZED HEALTH CARE EDUCATION/TRAINING PROGRAM

## 2022-08-24 RX ORDER — CLINDAMYCIN PHOSPHATE 10 UG/ML
LOTION TOPICAL
COMMUNITY
Start: 2022-07-30

## 2022-08-24 NOTE — PROGRESS NOTES
OB FOLLOW UP  Complaint   Chief Complaint   Patient presents with   • Routine Prenatal Visit            Lissa Guardado is a 37 y.o.  32w1d patient being seen today for her obstetrical follow up visit. Patient denies decreased fetal movement, contractions, loss of fluid or vaginal bleeding.  No acute events    Her prenatal care is complicated by (and status) :    Patient Active Problem List   Diagnosis   • Hx of  section   • Supervision of other normal pregnancy, antepartum   • Multigravida of advanced maternal age in third trimester   • COVID-19 virus detected       All other systems reviewed and are negative.     The additional following portions of the patient's history were reviewed and updated as appropriate: allergies, current medications, past family history, past medical history, past social history, past surgical history and problem list.      EXAM:     Vital signs: /70   Wt 82.6 kg (182 lb)   LMP 2022   BMI 31.24 kg/m²   Appearance/psychiatric: To be in no distress  Constitutional: The patient is well nourished.  Cardiovascular: She does not have edema.  Respiratory: Respiratory effort is normal.  Gastrointestinal: Abdomen is soft, gravid, nontender, no rashes, heart tones are present, fundal height is size equals dates    Pelvic Exam: No    Urine glucose/protein: See prenatal flowsheet       Assessment and Plan    Problem List Items Addressed This Visit        Gravid and     Hx of  section    Overview     3/30/2022 Fetal intolerance   2022 desires TOLAC          Supervision of other normal pregnancy, antepartum - Primary    Overview     Initial visit:  3/30/2022   • PNL:  • PAP/GC/CT/Urine culture:  • Blood type:  • Hx of HSV?:  No     Genetic testing:    Considering CF and NIPS   AFP negative    Vaccinations:  • COVID: 3/30/2022  Vaccinated and boosted      24-28 weeks:  • 1hr GCT: 2022   • Hct/Plt: 2022   • Tdap Rx 2022 done   • BC:  debating but wants to sign tubal P/W       Third Trimester:  • GBS  • Breast pump:    Ultrasound:  • Dating:  3/30/2022   • Anatomy: 22 normal anatomy, posterior placenta  • Follow up:     PLAN:               Relevant Orders    POC Urinalysis Dipstick (Completed)    Multigravida of advanced maternal age in third trimester    Relevant Orders    Fetal Nonstress Test       Other    COVID-19 virus detected    Overview     6/3/2022 ASA; NST  34-36 weeks               Impression  1. Pregnancy at 32w1d  2. Fetal status reassuring.   3. Activity and Exercise discussed.    Plan  1.  Begin NST secondary to advanced maternal age and history of COVID at 34 weeks  2.  Desires  and sterilization  3.  Follow-up 2 weeks        Patient was counseled to the following pregnancy precautions:  • Decreased fetal movement, if concern for decreased fetal movement please perform fetal kick counts you are looking for 10 movements in 2 hours.  If concern for fetal movement and not meeting that criteria, please present to triage for evaluation.  • Contractions occurring every 5 minutes for over an hour, lasting 30 to 60 seconds and progressively causing more discomfort, please seek medical attention to rule out labor  • If you believe that your water is broken, place a sanitary pad.  If pad fills in short period of time i.e. less than 5 minutes, take off pad placed another pad.  If this is saturated please present for rule out rupture of membranes  • Vaginal bleeding can be normal in pregnancy, this usually takes a form of spotting.  If having heavier bleeding like a menstrual period please present for evaluation; especially in light of severe abdominal pain this could represent a placental abruption.  • Keep all scheduled appointments as recommended.        Wali Flowers MD  2022

## 2022-09-06 ENCOUNTER — HOSPITAL ENCOUNTER (OUTPATIENT)
Facility: HOSPITAL | Age: 37
Discharge: HOME | End: 2022-09-06
Attending: OBSTETRICS & GYNECOLOGY | Admitting: OBSTETRICS & GYNECOLOGY

## 2022-09-06 VITALS
OXYGEN SATURATION: 98 % | HEART RATE: 75 BPM | TEMPERATURE: 98.2 F | DIASTOLIC BLOOD PRESSURE: 66 MMHG | RESPIRATION RATE: 18 BRPM | SYSTOLIC BLOOD PRESSURE: 116 MMHG

## 2022-09-06 PROCEDURE — G0463 HOSPITAL OUTPT CLINIC VISIT: HCPCS

## 2022-09-06 PROCEDURE — 59025 FETAL NON-STRESS TEST: CPT

## 2022-09-06 NOTE — NON STRESS TEST
Obstetrical Non-stress Test Interpretation     Name:  Lissa Guardado  MRN: [id number]    37 y.o. female  at 34w0d    Indication: AMA/Hx COVID      Fetal Assessment  Fetal Movement: active  Fetal HR Assessment Method: external  Fetal HR (beats/min): 145  Fetal HR Baseline: normal range  Fetal HR Variability: moderate (amplitude range 6 to 25 bpm)  Fetal HR Accelerations: greater than/equal to 15 bpm, lasting at least 15 seconds  Fetal HR Decelerations: absent  Sinusoidal Pattern Present: absent  Fetal HR Tracing Category: Category I    /66 (BP Location: Right arm, Patient Position: Sitting)   Pulse 75   Temp 98.2 °F (36.8 °C) (Oral)   Resp 18   LMP 2022   SpO2 98%     Reason for test: Other (Comment) (AMA/Hx COVID)  Date of Test: 2022  Time frame of test:   RN NST Interpretation: Reactive      Angeles Luna RN  2022  09:51 EDT

## 2022-09-07 ENCOUNTER — ROUTINE PRENATAL (OUTPATIENT)
Dept: OBSTETRICS AND GYNECOLOGY | Facility: CLINIC | Age: 37
End: 2022-09-07

## 2022-09-07 VITALS — SYSTOLIC BLOOD PRESSURE: 113 MMHG | WEIGHT: 184 LBS | DIASTOLIC BLOOD PRESSURE: 68 MMHG | BODY MASS INDEX: 31.58 KG/M2

## 2022-09-07 DIAGNOSIS — Z34.80 SUPERVISION OF OTHER NORMAL PREGNANCY, ANTEPARTUM: ICD-10-CM

## 2022-09-07 DIAGNOSIS — Z98.891 HX OF CESAREAN SECTION: Primary | ICD-10-CM

## 2022-09-07 DIAGNOSIS — O09.523 MULTIGRAVIDA OF ADVANCED MATERNAL AGE IN THIRD TRIMESTER: ICD-10-CM

## 2022-09-07 DIAGNOSIS — Z30.09 UNWANTED FERTILITY: ICD-10-CM

## 2022-09-07 LAB
GLUCOSE UR STRIP-MCNC: NEGATIVE MG/DL
PROT UR STRIP-MCNC: NEGATIVE MG/DL

## 2022-09-07 PROCEDURE — 0502F SUBSEQUENT PRENATAL CARE: CPT | Performed by: OBSTETRICS & GYNECOLOGY

## 2022-09-07 NOTE — PROGRESS NOTES
OB FOLLOW UP    CC: Scheduled OB routine FU     Prenatal care complicated by:   Patient Active Problem List   Diagnosis   • Hx of  section   • Supervision of other normal pregnancy, antepartum   • Multigravida of advanced maternal age in third trimester   • COVID-19 virus detected   • Unwanted fertility       Subjective:   Patient has: No complaints, No leaking fluid, No vaginal bleeding, No contractions, Adequate FM    Objective:  Urine glucose/protein- see flow sheet      /68   Wt 83.5 kg (184 lb)   LMP 2022   BMI 31.58 kg/m²   See OB flow for LE edema, and cvx exam if applicable  FHT: 142 BPM   Uterine Size: 34 cm      Assessment and Plan:  Diagnoses and all orders for this visit:    1. Hx of  section (Primary)  Overview:  3/30/2022 Fetal intolerance   Desires repeat  delivery: 10/12/2022 Dr. Jones with tubal      2. Supervision of other normal pregnancy, antepartum  Overview:  Genetic testing:    Considering CF and NIPS   AFP negative    Vaccinations:  • COVID: 3/30/2022  Vaccinated and boosted      24-28 weeks:  • 1hr GCT: 2022   • Hct/Plt: 2022   • Tdap Rx 2022 done    Assessment & Plan:  Continue prenatal vitamins  Fetal kick counts   labor warnings    Orders:  -     POC Urinalysis Dipstick    3. Multigravida of advanced maternal age in third trimester  Assessment & Plan:  Continue NSTs      4. Unwanted fertility  Overview:  Consent 2022          34w1d  Reassuring pregnancy progress    Counseling: OB precautions, leaking, VB, praneeth whitaker vs PTL/Labor  [facility]    Questions answered    Return in about 2 weeks (around 2022) for Recheck.      Dragan Jones MD  2022

## 2022-09-12 ENCOUNTER — HOSPITAL ENCOUNTER (OUTPATIENT)
Facility: HOSPITAL | Age: 37
Setting detail: SURGERY ADMIT
Discharge: HOME | End: 2022-09-12
Attending: STUDENT IN AN ORGANIZED HEALTH CARE EDUCATION/TRAINING PROGRAM | Admitting: STUDENT IN AN ORGANIZED HEALTH CARE EDUCATION/TRAINING PROGRAM

## 2022-09-12 VITALS
TEMPERATURE: 98.4 F | DIASTOLIC BLOOD PRESSURE: 69 MMHG | HEART RATE: 95 BPM | SYSTOLIC BLOOD PRESSURE: 116 MMHG | RESPIRATION RATE: 18 BRPM

## 2022-09-12 PROCEDURE — 59025 FETAL NON-STRESS TEST: CPT

## 2022-09-12 PROCEDURE — 59025 FETAL NON-STRESS TEST: CPT | Performed by: STUDENT IN AN ORGANIZED HEALTH CARE EDUCATION/TRAINING PROGRAM

## 2022-09-12 NOTE — NON STRESS TEST
Obstetrical Non-stress Test Interpretation     Name:  Lissa Guardado  MRN: [id number]    37 y.o. female  at 34w6d    Indication: AMA, Hx. Of Covid      Fetal Assessment  Fetal Movement: active  Fetal HR Assessment Method: external  Fetal HR (beats/min): 135  Fetal HR Baseline: normal range  Fetal HR Variability: moderate (amplitude range 6 to 25 bpm)  Fetal HR Accelerations: greater than/equal to 15 bpm, lasting at least 15 seconds  Fetal HR Decelerations: absent  Sinusoidal Pattern Present: absent  Fetal HR Tracing Category: Category I    /69 (BP Location: Right arm, Patient Position: Sitting)   Pulse 95   Temp 98.4 °F (36.9 °C) (Oral)   Resp 18   LMP 2022     Reason for test:    Date of Test: 2022  Time frame of test:   RN NST Interpretation: Roseanna oMulton RN  2022  16:53 EDT

## 2022-09-13 ENCOUNTER — APPOINTMENT (OUTPATIENT)
Dept: LABOR AND DELIVERY | Facility: HOSPITAL | Age: 37
End: 2022-09-13

## 2022-09-19 ENCOUNTER — HOSPITAL ENCOUNTER (OUTPATIENT)
Facility: HOSPITAL | Age: 37
Discharge: HOME | End: 2022-09-19
Attending: STUDENT IN AN ORGANIZED HEALTH CARE EDUCATION/TRAINING PROGRAM | Admitting: OBSTETRICS & GYNECOLOGY

## 2022-09-19 ENCOUNTER — HOSPITAL ENCOUNTER (OUTPATIENT)
Dept: LABOR AND DELIVERY | Facility: HOSPITAL | Age: 37
Setting detail: SURGERY ADMIT
Discharge: HOME | End: 2022-09-19

## 2022-09-19 VITALS
HEIGHT: 64 IN | SYSTOLIC BLOOD PRESSURE: 110 MMHG | DIASTOLIC BLOOD PRESSURE: 66 MMHG | WEIGHT: 184 LBS | BODY MASS INDEX: 31.41 KG/M2 | HEART RATE: 90 BPM

## 2022-09-19 PROCEDURE — 59025 FETAL NON-STRESS TEST: CPT | Performed by: STUDENT IN AN ORGANIZED HEALTH CARE EDUCATION/TRAINING PROGRAM

## 2022-09-19 PROCEDURE — 59025 FETAL NON-STRESS TEST: CPT

## 2022-09-19 NOTE — NON STRESS TEST
"Obstetrical Non-stress Test Interpretation     Name:  Lissa Guardado  MRN: [id number]    37 y.o. female  at 35w6d    Indication: hx covid      Fetal Assessment  Fetal Movement: active  Fetal HR Assessment Method: external  Fetal HR (beats/min): 140  Fetal HR Baseline: normal range  Fetal HR Variability: moderate (amplitude range 6 to 25 bpm)  Fetal HR Accelerations: greater than/equal to 15 bpm, lasting at least 15 seconds  Fetal HR Decelerations: absent    /66   Pulse 90   Ht 162.6 cm (64\")   Wt 83.5 kg (184 lb)   LMP 2022   BMI 31.58 kg/m²     Reason for test: Other (Comment) (hx covid)  Date of Test: 2022  Time frame of test:   RN NST Interpretation: Roseanna Warren  2022  09:23 EDT  "

## 2022-09-20 ENCOUNTER — ROUTINE PRENATAL (OUTPATIENT)
Dept: OBSTETRICS AND GYNECOLOGY | Facility: CLINIC | Age: 37
End: 2022-09-20

## 2022-09-20 VITALS — SYSTOLIC BLOOD PRESSURE: 123 MMHG | WEIGHT: 185 LBS | DIASTOLIC BLOOD PRESSURE: 76 MMHG | BODY MASS INDEX: 31.76 KG/M2

## 2022-09-20 DIAGNOSIS — Z30.09 UNWANTED FERTILITY: ICD-10-CM

## 2022-09-20 DIAGNOSIS — O09.523 MULTIGRAVIDA OF ADVANCED MATERNAL AGE IN THIRD TRIMESTER: ICD-10-CM

## 2022-09-20 DIAGNOSIS — Z98.891 HX OF CESAREAN SECTION: ICD-10-CM

## 2022-09-20 DIAGNOSIS — Z34.80 SUPERVISION OF OTHER NORMAL PREGNANCY, ANTEPARTUM: Primary | ICD-10-CM

## 2022-09-20 LAB
GLUCOSE UR STRIP-MCNC: NEGATIVE MG/DL
PROT UR STRIP-MCNC: NEGATIVE MG/DL

## 2022-09-20 PROCEDURE — 0502F SUBSEQUENT PRENATAL CARE: CPT | Performed by: OBSTETRICS & GYNECOLOGY

## 2022-09-20 PROCEDURE — 87081 CULTURE SCREEN ONLY: CPT | Performed by: OBSTETRICS & GYNECOLOGY

## 2022-09-20 NOTE — PROGRESS NOTES
OB FOLLOW UP    CC: Scheduled OB routine FU     Prenatal care complicated by:   Patient Active Problem List   Diagnosis   • Hx of  section   • Supervision of other normal pregnancy, antepartum   • Multigravida of advanced maternal age in third trimester   • COVID-19 virus detected   • Unwanted fertility       Subjective:   Patient has: No complaints, No leaking fluid, No vaginal bleeding, Adequate FM  Reports occasional Praneeth Dale contractions    Objective:  Urine glucose/protein- see flow sheet      /76   Wt 83.9 kg (185 lb)   LMP 2022   BMI 31.76 kg/m²   See OB flow for LE edema, and cvx exam if applicable  FHT: 138 BPM   Uterine Size: 36 cm      Assessment and Plan:  Diagnoses and all orders for this visit:    1. Supervision of other normal pregnancy, antepartum (Primary)  Overview:  Genetic testing:    Considering CF and NIPS   AFP negative    Vaccinations:  • COVID: 3/30/2022  Vaccinated and boosted      24-28 weeks:  • 1hr GCT: 2022   • Hct/Plt: 2022   • Tdap Rx 2022 done    Assessment & Plan:  GBS collected  Continue prenatal vitamins  Fetal kick counts   labor warnings    Orders:  -     POC Urinalysis Dipstick  -     Group B Streptococcus Culture - Swab, Vaginal/Rectum    2. Hx of  section  Overview:  3/30/2022 Fetal intolerance   Desires repeat  delivery: 10/12/2022 Dr. Jones with tubal      3. Multigravida of advanced maternal age in third trimester  Assessment & Plan:  Continue NSTs      4. Unwanted fertility  Overview:  Consent 2022          36w0d  Reassuring pregnancy progress    Counseling: OB precautions, leaking, VB, praneeth dale vs PTL/Labor  [facility]    Questions answered    Return in about 1 week (around 2022) for Recheck.      Dragan Jones MD  2022  
Yes

## 2022-09-22 LAB — BACTERIA SPEC AEROBE CULT: NORMAL

## 2022-09-26 ENCOUNTER — HOSPITAL ENCOUNTER (OUTPATIENT)
Facility: HOSPITAL | Age: 37
Discharge: HOME | End: 2022-09-26
Attending: STUDENT IN AN ORGANIZED HEALTH CARE EDUCATION/TRAINING PROGRAM | Admitting: STUDENT IN AN ORGANIZED HEALTH CARE EDUCATION/TRAINING PROGRAM

## 2022-09-26 ENCOUNTER — HOSPITAL ENCOUNTER (OUTPATIENT)
Dept: LABOR AND DELIVERY | Facility: HOSPITAL | Age: 37
Setting detail: SURGERY ADMIT
Discharge: HOME | End: 2022-09-26

## 2022-09-26 VITALS
DIASTOLIC BLOOD PRESSURE: 61 MMHG | SYSTOLIC BLOOD PRESSURE: 106 MMHG | HEART RATE: 73 BPM | TEMPERATURE: 98.2 F | RESPIRATION RATE: 18 BRPM

## 2022-09-26 PROCEDURE — 59025 FETAL NON-STRESS TEST: CPT

## 2022-09-26 PROCEDURE — 59025 FETAL NON-STRESS TEST: CPT | Performed by: STUDENT IN AN ORGANIZED HEALTH CARE EDUCATION/TRAINING PROGRAM

## 2022-09-26 NOTE — NON STRESS TEST
Obstetrical Non-stress Test Interpretation     Name:  Lissa Guardado  MRN: [id number]    37 y.o. female  at 36w6d    Indication: advanced maternal age      Fetal Movement: active  Fetal HR Assessment Method: external  Fetal HR (beats/min): 140  Fetal HR Baseline: normal range  Fetal HR Variability: moderate (amplitude range 6 to 25 bpm)  Fetal HR Accelerations: episodic, greater than/equal to 15 bpm, lasting at least 15 seconds  Fetal HR Decelerations: absent  Sinusoidal Pattern Present: absent  Fetal HR Tracing Category: Category I    /61 (BP Location: Right arm, Patient Position: Sitting)   Pulse 73   Temp 98.2 °F (36.8 °C) (Oral)   Resp 18   LMP 2022     Reason for test: Other (Comment) (advanced maternal age)  Date of Test: 2022  Time frame of test:   RN NST Interpretation: Reactive      Marci Lainez RN  2022  09:24 EDT

## 2022-09-27 ENCOUNTER — ROUTINE PRENATAL (OUTPATIENT)
Dept: OBSTETRICS AND GYNECOLOGY | Facility: CLINIC | Age: 37
End: 2022-09-27

## 2022-09-27 VITALS — DIASTOLIC BLOOD PRESSURE: 71 MMHG | SYSTOLIC BLOOD PRESSURE: 112 MMHG | BODY MASS INDEX: 32.27 KG/M2 | WEIGHT: 188 LBS

## 2022-09-27 DIAGNOSIS — Z34.80 SUPERVISION OF OTHER NORMAL PREGNANCY, ANTEPARTUM: Primary | ICD-10-CM

## 2022-09-27 DIAGNOSIS — O09.523 MULTIGRAVIDA OF ADVANCED MATERNAL AGE IN THIRD TRIMESTER: ICD-10-CM

## 2022-09-27 DIAGNOSIS — Z30.09 UNWANTED FERTILITY: ICD-10-CM

## 2022-09-27 DIAGNOSIS — Z98.891 HX OF CESAREAN SECTION: ICD-10-CM

## 2022-09-27 LAB
GLUCOSE UR STRIP-MCNC: NEGATIVE MG/DL
PROT UR STRIP-MCNC: NEGATIVE MG/DL

## 2022-09-27 PROCEDURE — 0502F SUBSEQUENT PRENATAL CARE: CPT | Performed by: OBSTETRICS & GYNECOLOGY

## 2022-09-27 NOTE — PROGRESS NOTES
OB FOLLOW UP    CC: Scheduled OB routine FU     Prenatal care complicated by:   Patient Active Problem List   Diagnosis   • Hx of  section   • Supervision of other normal pregnancy, antepartum   • Multigravida of advanced maternal age in third trimester   • COVID-19 virus detected   • Unwanted fertility       Subjective:   Patient has: No complaints, No leaking fluid, No vaginal bleeding, No contractions, Adequate FM    Objective:  Urine glucose/protein- see flow sheet      /71   Wt 85.3 kg (188 lb)   LMP 2022   BMI 32.27 kg/m²   See OB flow for LE edema, and cvx exam if applicable  FHT: 148 BPM   Uterine Size: 37 cm      Assessment and Plan:  Diagnoses and all orders for this visit:    1. Supervision of other normal pregnancy, antepartum (Primary)  Overview:  Genetic testing:    Considering CF and NIPS   AFP negative    Vaccinations:  • COVID: 3/30/2022  Vaccinated and boosted      24-28 weeks:  • 1hr GCT: 2022   • Hct/Plt: 2022   • Tdap Rx 2022 done    Assessment & Plan:  Continue prenatal vitamins  Fetal kick counts  Labor instructions  GBS negative    Orders:  -     POC Urinalysis Dipstick    2. Hx of  section  Overview:  3/30/2022 Fetal intolerance   Desires repeat  delivery: 10/12/2022 Dr. Jones with tubal      3. Multigravida of advanced maternal age in third trimester  Assessment & Plan:  Continue NSTs      4. Unwanted fertility  Overview:  Consent 2022          37w0d  Reassuring pregnancy progress    Counseling: OB precautions, leaking, VB, praneeth whitaker vs PTL/Labor  [facility]    Questions answered    Return in about 1 week (around 10/4/2022) for Recheck.      rDagan Jones MD  2022

## 2022-10-03 ENCOUNTER — HOSPITAL ENCOUNTER (OUTPATIENT)
Facility: HOSPITAL | Age: 37
Discharge: HOME | End: 2022-10-03
Attending: STUDENT IN AN ORGANIZED HEALTH CARE EDUCATION/TRAINING PROGRAM | Admitting: STUDENT IN AN ORGANIZED HEALTH CARE EDUCATION/TRAINING PROGRAM

## 2022-10-03 ENCOUNTER — HOSPITAL ENCOUNTER (OUTPATIENT)
Dept: LABOR AND DELIVERY | Facility: HOSPITAL | Age: 37
Setting detail: SURGERY ADMIT
Discharge: HOME | End: 2022-10-03

## 2022-10-03 VITALS — HEART RATE: 84 BPM | DIASTOLIC BLOOD PRESSURE: 71 MMHG | RESPIRATION RATE: 16 BRPM | SYSTOLIC BLOOD PRESSURE: 109 MMHG

## 2022-10-03 PROCEDURE — 59025 FETAL NON-STRESS TEST: CPT

## 2022-10-03 PROCEDURE — 59025 FETAL NON-STRESS TEST: CPT | Performed by: STUDENT IN AN ORGANIZED HEALTH CARE EDUCATION/TRAINING PROGRAM

## 2022-10-03 NOTE — NON STRESS TEST
Obstetrical Non-stress Test Interpretation     Name:  Lissa Guardado  MRN: [id number]    37 y.o. female  at 37w6d    Indication: Advanced maternal age      Fetal Movement: active  Fetal HR Assessment Method: external  Fetal HR (beats/min): 140  Fetal HR Baseline: normal range  Fetal HR Variability: moderate (amplitude range 6 to 25 bpm)  Fetal HR Accelerations: episodic, greater than/equal to 15 bpm, lasting at least 15 seconds  Fetal HR Decelerations: absent  Sinusoidal Pattern Present: absent  Fetal HR Tracing Category: Category I    /71 (BP Location: Right arm, Patient Position: Sitting)   Pulse 84   Resp 16   LMP 2022     Reason for test: Other (Comment) (Advanced Maternal Age)  Date of Test: 10/3/2022  Time frame of test:   RN NST Interpretation: Reactive      Marci Lainez RN  10/3/2022  09:31 EDT

## 2022-10-04 ENCOUNTER — ROUTINE PRENATAL (OUTPATIENT)
Dept: OBSTETRICS AND GYNECOLOGY | Facility: CLINIC | Age: 37
End: 2022-10-04

## 2022-10-04 VITALS — SYSTOLIC BLOOD PRESSURE: 122 MMHG | WEIGHT: 189 LBS | DIASTOLIC BLOOD PRESSURE: 77 MMHG | BODY MASS INDEX: 32.44 KG/M2

## 2022-10-04 DIAGNOSIS — U07.1 COVID-19 VIRUS DETECTED: ICD-10-CM

## 2022-10-04 DIAGNOSIS — O09.523 MULTIGRAVIDA OF ADVANCED MATERNAL AGE IN THIRD TRIMESTER: ICD-10-CM

## 2022-10-04 DIAGNOSIS — Z98.891 HX OF CESAREAN SECTION: ICD-10-CM

## 2022-10-04 DIAGNOSIS — Z34.80 SUPERVISION OF OTHER NORMAL PREGNANCY, ANTEPARTUM: Primary | ICD-10-CM

## 2022-10-04 DIAGNOSIS — Z30.09 UNWANTED FERTILITY: ICD-10-CM

## 2022-10-04 LAB
GLUCOSE UR STRIP-MCNC: NEGATIVE MG/DL
PROT UR STRIP-MCNC: NEGATIVE MG/DL

## 2022-10-04 PROCEDURE — 0502F SUBSEQUENT PRENATAL CARE: CPT | Performed by: OBSTETRICS & GYNECOLOGY

## 2022-10-04 NOTE — PROGRESS NOTES
OB FOLLOW UP    CC: Scheduled OB routine FU     Prenatal care complicated by:   Patient Active Problem List   Diagnosis   • Hx of  section   • Supervision of other normal pregnancy, antepartum   • Multigravida of advanced maternal age in third trimester   • COVID-19 virus detected   • Unwanted fertility       Subjective:   Patient has: No complaints, No leaking fluid, No vaginal bleeding, Adequate FM    Objective:  Urine glucose/protein- see flow sheet      /77   Wt 85.7 kg (189 lb)   LMP 2022   BMI 32.44 kg/m²   See OB flow for LE edema, and cvx exam if applicable  FHT: 132 BPM   Uterine Size: 38 cm      Assessment and Plan:  Diagnoses and all orders for this visit:    1. Supervision of other normal pregnancy, antepartum (Primary)  Overview:  Genetic testing:    Considering CF and NIPS   AFP negative    Vaccinations:  • COVID: 3/30/2022  Vaccinated and boosted      24-28 weeks:  • 1hr GCT: 2022   • Hct/Plt: 2022   • Tdap Rx 2022 done    Assessment & Plan:  Continue prenatal vitamins  Fetal kick counts  Labor instructions    Orders:  -     POC Urinalysis Dipstick    2. Hx of  section  Overview:  3/30/2022 Fetal intolerance   Desires repeat  delivery: 10/12/2022 Dr. Jones with tubal    Assessment & Plan:  We have reviewed the risks, benefits, and alternatives of the procedure including the risk of: bleeding, infection, hemorrhage, blood transfusion, risk of injury to nearby structures including: bowl, bladder, pelvic blood vessels and nerves, risk of injury to the baby, risk of anesthesia, venous thromboembolism, myocardial infarction, stroke, and death. We have also discussed the risks of repetitive  deliveries including the risk of abnormal placentation such as placenta accreta. The patient expresses her understanding of these risks and wishes to proceed.      3. Multigravida of advanced maternal age in third trimester  Assessment & Plan:  Continue  NSTs      4. Unwanted fertility  Overview:  Consent 8/11/2022    Assessment & Plan:  We have discussed the risks, benefits, and alternatives to the procedure.  We discussed the risks including the risk of infection, bleeding and hemorrhage, injury to nearby structure including, bowel, bladder, pelvic vasculature, pelvic nerves, ovaries, and the uterus, and other nearby structures.  We have discussed the risks of regret, risk of failure, and if failure occurred and increased risk for ectopic pregnancy.  We discussed the risk of menstrual changes, hormonal changes, and risk of pelvic pain post sterilization.  The patient has been offered alternative forms of birth control including: Oral contraceptives, NuvaRing, birth control patches, progesterone only birth control pills, Depo-Provera, all intrauterine contraceptives, partner vasectomy.  The patient expresses her understanding and wished to proceed with female permanent sterilization.      5. COVID-19 virus detected  Overview:  6/3/2022 ASA; NST  34-36 weeks          38w0d  Reassuring pregnancy progress    Counseling: OB precautions, leaking, VB, praneeth whitaker vs PTL/Labor  [facility]    Questions answered    Return in about 6 weeks (around 11/15/2022) for postpartum visit.      Dragan Jones MD  10/04/2022

## 2022-10-05 NOTE — ASSESSMENT & PLAN NOTE
We have discussed the risks, benefits, and alternatives to the procedure.  We discussed the risks including the risk of infection, bleeding and hemorrhage, injury to nearby structure including, bowel, bladder, pelvic vasculature, pelvic nerves, ovaries, and the uterus, and other nearby structures.  We have discussed the risks of regret, risk of failure, and if failure occurred and increased risk for ectopic pregnancy.  We discussed the risk of menstrual changes, hormonal changes, and risk of pelvic pain post sterilization.  The patient has been offered alternative forms of birth control including: Oral contraceptives, NuvaRing, birth control patches, progesterone only birth control pills, Depo-Provera, all intrauterine contraceptives, partner vasectomy.  The patient expresses her understanding and wished to proceed with female permanent sterilization.

## 2022-10-11 PROBLEM — Z34.80 SUPERVISION OF OTHER NORMAL PREGNANCY, ANTEPARTUM: Status: RESOLVED | Noted: 2022-03-30 | Resolved: 2022-10-11

## 2022-10-11 PROCEDURE — S0260 H&P FOR SURGERY: HCPCS | Performed by: OBSTETRICS & GYNECOLOGY

## 2022-10-11 NOTE — H&P
LIAT Burger  Obstetric History and Physical    Chief Complaint:  Scheduled CS    Subjective:  The patient is a 37 y.o.  currently at 39w0d, who presents for a scheduled  delivery.  She has no complaints today.  She reports irregular contractions.  She denies any vaginal bleeding, loss of fluid, or decreased fetal movement.  She states she is 100% sure she desires proceed with sterilization.  She has no desire for future childbearing.  She declines alternative birth control methods.    Her prenatal care is complicated by: AMA, Prior     The following portions of the patients history were reviewed and updated as appropriate: current medications, allergies, past medical history, past surgical history, past family history, past social history and problem list .     Prenatal Information:  Prenatal Results     POC Urine Glucose/Protein     Test Value Reference Range Date Time    Urine Glucose  Negative mg/dL Negative 10/04/22 1618    Urine Protein  Negative mg/dL Negative 10/04/22 1618          Initial Prenatal Labs     Test Value Reference Range Date Time    Hemoglobin  13.8 g/dL 12.0 - 15.9 22 1604    Hematocrit  40.3 % 34.0 - 46.6 22 1604    Platelets  323 10*3/mm3 140 - 450 22 1604    Rubella IgG  4.96 index Immune >0.99 22 1604    Hepatitis B SAg  Non-Reactive  Non-Reactive 22 1604    Hepatitis C Ab  Non-Reactive  Non-Reactive 22 1604    RPR  Non-Reactive  Non-Reactive 22 1604    ABO  O   22 1604    Rh  Positive   22 1604    Antibody Screen  Negative   22 1604    HIV  Non-Reactive  Non-Reactive 22 1604    Urine Culture  No growth   22 1543    Gonorrhea  Not Detected  Not Detected  22 1543    Chlamydia  Not Detected  Not Detected  22 1543    TSH        HgB A1c               2nd and 3rd Trimester     Test Value Reference Range Date Time    Hemoglobin (repeated)  12.0 g/dL 12.0 - 15.9 22 1555    Hematocrit  (repeated)  36.0 % 34.0 - 46.6 06/30/22 1555    Platelets   283 10*3/mm3 140 - 450 06/30/22 1555       323 10*3/mm3 140 - 450 03/30/22 1604    GCT  130 mg/dL 65 - 139 06/30/22 1555    Antibody Screen (repeated)        GTT Fasting  84 mg/dL 65 - 94 07/07/22 0844    GTT 1 Hr  126 mg/dL 65 - 179 07/07/22 0943    GTT 2 Hr  135 mg/dL 65 - 154 07/07/22 1041    GTT 3 Hr  82 mg/dL 65 - 139 07/07/22 1145    Group B Strep  No Group B Streptococcus isolated   09/20/22 1145          Drug Screening     Test Value Reference Range Date Time    Amphetamine Screen        Barbiturate Screen        Benzodiazepine Screen        Methadone Screen        Phencyclidine Screen        Opiates Screen        THC Screen        Cocaine Screen        Propoxyphene Screen        Buprenorphine Screen        Methamphetamine Screen        Oxycodone Screen        Tricyclic Antidepressants Screen              Other (Risk screening)     Test Value Reference Range Date Time    Varicella IgG        Parvovirus IgG        CMV IgG        Cystic Fibrosis        Hemoglobin electrophoresis        NIPT        MSAFP-4        AFP (for NTD only)  *Screen Negative*   04/28/22 0836          Legend    ^: Historical                      External Prenatal Results     Pregnancy Outside Results - Transcribed From Office Records - See Scanned Records For Details     Test Value Date Time    ABO  O  03/30/22 1604    Rh  Positive  03/30/22 1604    Antibody Screen  Negative  03/30/22 1604    Varicella IgG       Rubella  4.96 index 03/30/22 1604    Hgb  12.0 g/dL 06/30/22 1555       13.8 g/dL 03/30/22 1604    Hct  36.0 % 06/30/22 1555       40.3 % 03/30/22 1604    Glucose Fasting GTT  84 mg/dL 07/07/22 0844    Glucose Tolerance Test 1 hour  126 mg/dL 07/07/22 0943    Glucose Tolerance Test 3 hour  82 mg/dL 07/07/22 1145    Gonorrhea (discrete)  Not Detected  03/30/22 1543    Chlamydia (discrete)  Not Detected  03/30/22 1543    RPR  Non-Reactive  03/30/22 1604    VDRL        Syphilis Antibody       HBsAg  Non-Reactive  22 1604    Herpes Simplex Virus PCR       Herpes Simplex VIrus Culture       HIV  Non-Reactive  22 1604    Hep C RNA Quant PCR       Hep C Antibody  Non-Reactive  22 1604    AFP  38.0 ng/mL 22 0836    Group B Strep  No Group B Streptococcus isolated  22 1145    GBS Susceptibility to Clindamycin       GBS Susceptibility to Erythromycin       Fetal Fibronectin       Genetic Testing, Maternal Blood             Drug Screening     Test Value Date Time    Urine Drug Screen       Amphetamine Screen       Barbiturate Screen       Benzodiazepine Screen       Methadone Screen       Phencyclidine Screen       Opiates Screen       THC Screen       Cocaine Screen       Propoxyphene Screen       Buprenorphine Screen       Methamphetamine Screen       Oxycodone Screen       Tricyclic Antidepressants Screen             Legend    ^: Historical                        Past OB History:  OB History    Para Term  AB Living   4 1 1 0 2 1   SAB IAB Ectopic Molar Multiple Live Births   2 0 0 0 0 1      # Outcome Date GA Lbr Paul/2nd Weight Sex Delivery Anes PTL Lv   4 Current            3 Term 2016    F CS-LTranv   MIA      Birth Comments: Heart rate drop so ended up being emergency c/section    2 SAB            1 SAB                Past Medical History:  Past Medical History:   Diagnosis Date   • Abnormal Pap smear of cervix        Past Surgical History:  Past Surgical History:   Procedure Laterality Date   •  SECTION     • NOSE SURGERY      rhinoplasty       Family History:  Family History   Problem Relation Age of Onset   • Diabetes Paternal Grandmother    • Diabetes Other        Social History:   reports that she has never smoked. She has never used smokeless tobacco.   reports that she does not currently use alcohol.   reports no history of drug use.    Medications:  Loratadine, Prenatal Vitamin, aspirin, and clindamycin    Allergies:  No  Known Allergies    Review of Systems:  No leaking fluid, No vaginal bleeding, Adequate FM, No HA, No scotomata or vision changes, No RUQ/epigastric pain, No fever/chills, No nausea, No vomiting, No diarrhea, No constipation, No abdominal pain, Contractions, Swelling and Back pain    Objective     Vital Signs:  BP: (115-119)/(69-75) 115/75     Physical Exam:    General:  alert, well appearing, in no apparent distress  HEENT: PERRLA, extra ocular movement intact, sclera clear, anicteric and neck supple with midline trachea  Cardiovascular: normal rate, regular rhythm,  no murmurs, rubs, or gallops  Lungs: clear to auscultation, no wheezes, rales or rhonchi, symmetric air entry  Abdomen: soft, nontender, nondistended, no abnormal masses, no epigastric pain, fundus soft, nontender 39 weeks size and estimated fetal weight: 7-7.5 lbs  Extremities: 1+ edema, no redness or tenderness in the calves or thighs 2+ bilaterally    Labs:   Lab Results (last 24 hours)     Procedure Component Value Units Date/Time    CBC & Differential [[id number]]  (Abnormal) Collected: 10/12/22 0612    Specimen: Blood Updated: 10/12/22 0619    Narrative:      The following orders were created for panel order CBC & Differential.  Procedure                               Abnormality         Status                     ---------                               -----------         ------                     CBC Auto Differential[[id number]]        Abnormal            Final result                 Please view results for these tests on the individual orders.    CBC Auto Differential [[id number]]  (Abnormal) Collected: 10/12/22 0612    Specimen: Blood Updated: 10/12/22 0619     WBC 8.19 10*3/mm3      RBC 3.76 10*6/mm3      Hemoglobin 13.1 g/dL      Hematocrit 35.6 %      MCV 94.7 fL      MCH 34.8 pg      MCHC 36.8 g/dL      RDW 12.5 %      RDW-SD 43.0 fl      MPV 10.4 fL      Platelets 288 10*3/mm3      Neutrophil % 68.2 %      Lymphocyte % 24.1 %      Monocyte  % 6.2 %      Eosinophil % 0.6 %      Basophil % 0.2 %      Immature Grans % 0.7 %      Neutrophils, Absolute 5.58 10*3/mm3      Lymphocytes, Absolute 1.97 10*3/mm3      Monocytes, Absolute 0.51 10*3/mm3      Eosinophils, Absolute 0.05 10*3/mm3      Basophils, Absolute 0.02 10*3/mm3      Immature Grans, Absolute 0.06 10*3/mm3      nRBC 0.0 /100 WBC            Assessment:  37 y.o.  currently at 39w0d    Previous  delivery affecting pregnancy    Hx of  section    Multigravida of advanced maternal age in third trimester    Unwanted fertility    GBS: Negative    Plan  We have reviewed the risks, benefits, and alternatives of the procedure including the risk of: bleeding, infection, hemorrhage, blood transfusion, risk of injury to nearby structures including: bowl, bladder, pelvic blood vessels and nerves, risk of injury to the baby, risk of anesthesia, venous thromboembolism, myocardial infarction, stroke, and death. We have also discussed the risks of repetitive  deliveries including the risk of abnormal placentation such as placenta accreta. The patient expresses her understanding of these risks and wishes to proceed.  We have discussed the risks, benefits, and alternatives to permanent sterilization at time of  delivery.  We discussed the risks including the risk of infection, bleeding and hemorrhage, injury to nearby structure including, bowel, bladder, pelvic vasculature, pelvic nerves, ovaries, and the uterus, and other nearby structures.  We have discussed the risks of regret, risk of failure, and if failure occurred and increased risk for ectopic pregnancy.  We discussed the risk of menstrual changes, hormonal changes, and risk of pelvic pain post sterilization.  The patient has been offered alternative forms of birth control including: Oral contraceptives, NuvaRing, birth control patches, progesterone only birth control pills, Depo-Provera, all intrauterine contraceptives,  partner vasectomy.  The patient expresses her understanding and wished to proceed with female permanent sterilization.Risks, benefits of treatment plan have been discussed.  All questions have been answered.    Dragan Jones MD  10/12/2022  07:13 EDT

## 2022-10-12 ENCOUNTER — HOSPITAL ENCOUNTER (INPATIENT)
Facility: HOSPITAL | Age: 37
LOS: 2 days | Discharge: HOME | End: 2022-10-14
Attending: OBSTETRICS & GYNECOLOGY | Admitting: OBSTETRICS & GYNECOLOGY

## 2022-10-12 ENCOUNTER — ANESTHESIA (OUTPATIENT)
Dept: LABOR AND DELIVERY | Facility: HOSPITAL | Age: 37
End: 2022-10-12

## 2022-10-12 ENCOUNTER — ANESTHESIA EVENT (OUTPATIENT)
Dept: LABOR AND DELIVERY | Facility: HOSPITAL | Age: 37
End: 2022-10-12

## 2022-10-12 PROBLEM — O34.219 PREVIOUS CESAREAN DELIVERY AFFECTING PREGNANCY: Status: ACTIVE | Noted: 2022-10-12

## 2022-10-12 LAB
ABO GROUP BLD: NORMAL
BASE EXCESS BLDCOV CALC-SCNC: -3.1 MMOL/L (ref -2–2)
BASOPHILS # BLD AUTO: 0.02 10*3/MM3 (ref 0–0.2)
BASOPHILS NFR BLD AUTO: 0.2 % (ref 0–1.5)
BLD GP AB SCN SERPL QL: NEGATIVE
DEPRECATED RDW RBC AUTO: 43 FL (ref 37–54)
EOSINOPHIL # BLD AUTO: 0.05 10*3/MM3 (ref 0–0.4)
EOSINOPHIL NFR BLD AUTO: 0.6 % (ref 0.3–6.2)
ERYTHROCYTE [DISTWIDTH] IN BLOOD BY AUTOMATED COUNT: 12.5 % (ref 12.3–15.4)
HCO3 BLDCOV-SCNC: 22.3 MMOL/L
HCT VFR BLD AUTO: 35.6 % (ref 34–46.6)
HGB BLD-MCNC: 13.1 G/DL (ref 12–15.9)
IMM GRANULOCYTES # BLD AUTO: 0.06 10*3/MM3 (ref 0–0.05)
IMM GRANULOCYTES NFR BLD AUTO: 0.7 % (ref 0–0.5)
LYMPHOCYTES # BLD AUTO: 1.97 10*3/MM3 (ref 0.7–3.1)
LYMPHOCYTES NFR BLD AUTO: 24.1 % (ref 19.6–45.3)
MCH RBC QN AUTO: 34.8 PG (ref 26.6–33)
MCHC RBC AUTO-ENTMCNC: 36.8 G/DL (ref 31.5–35.7)
MCV RBC AUTO: 94.7 FL (ref 79–97)
MONOCYTES # BLD AUTO: 0.51 10*3/MM3 (ref 0.1–0.9)
MONOCYTES NFR BLD AUTO: 6.2 % (ref 5–12)
NEUTROPHILS NFR BLD AUTO: 5.58 10*3/MM3 (ref 1.7–7)
NEUTROPHILS NFR BLD AUTO: 68.2 % (ref 42.7–76)
NRBC BLD AUTO-RTO: 0 /100 WBC (ref 0–0.2)
PCO2 BLDCOV: 41.2 MM HG (ref 28–40)
PH BLDCOV: 7.35 PH UNITS (ref 7.31–7.37)
PLATELET # BLD AUTO: 288 10*3/MM3 (ref 140–450)
PMV BLD AUTO: 10.4 FL (ref 6–12)
PO2 BLDCOV: <40.5 MM HG (ref 21–31)
RBC # BLD AUTO: 3.76 10*6/MM3 (ref 3.77–5.28)
RH BLD: POSITIVE
T&S EXPIRATION DATE: NORMAL
WBC NRBC COR # BLD: 8.19 10*3/MM3 (ref 3.4–10.8)

## 2022-10-12 PROCEDURE — 86901 BLOOD TYPING SEROLOGIC RH(D): CPT | Performed by: OBSTETRICS & GYNECOLOGY

## 2022-10-12 PROCEDURE — 82803 BLOOD GASES ANY COMBINATION: CPT | Performed by: OBSTETRICS & GYNECOLOGY

## 2022-10-12 PROCEDURE — 25010000002 OXYTOCIN PER 10 UNITS: Performed by: NURSE ANESTHETIST, CERTIFIED REGISTERED

## 2022-10-12 PROCEDURE — 0UL70CZ OCCLUSION OF BILATERAL FALLOPIAN TUBES WITH EXTRALUMINAL DEVICE, OPEN APPROACH: ICD-10-PCS | Performed by: OBSTETRICS & GYNECOLOGY

## 2022-10-12 PROCEDURE — 25010000002 HYDROMORPHONE PER 4 MG: Performed by: NURSE ANESTHETIST, CERTIFIED REGISTERED

## 2022-10-12 PROCEDURE — 0UN90ZZ RELEASE UTERUS, OPEN APPROACH: ICD-10-PCS | Performed by: OBSTETRICS & GYNECOLOGY

## 2022-10-12 PROCEDURE — 58611 LIGATE OVIDUCT(S) ADD-ON: CPT | Performed by: OBSTETRICS & GYNECOLOGY

## 2022-10-12 PROCEDURE — 25010000002 METOCLOPRAMIDE PER 10 MG: Performed by: OBSTETRICS & GYNECOLOGY

## 2022-10-12 PROCEDURE — 86900 BLOOD TYPING SEROLOGIC ABO: CPT | Performed by: OBSTETRICS & GYNECOLOGY

## 2022-10-12 PROCEDURE — 59510 CESAREAN DELIVERY: CPT | Performed by: OBSTETRICS & GYNECOLOGY

## 2022-10-12 PROCEDURE — 86850 RBC ANTIBODY SCREEN: CPT | Performed by: OBSTETRICS & GYNECOLOGY

## 2022-10-12 PROCEDURE — 25010000002 ONDANSETRON PER 1 MG: Performed by: OBSTETRICS & GYNECOLOGY

## 2022-10-12 PROCEDURE — 25010000002 FENTANYL CITRATE (PF) 50 MCG/ML SOLUTION: Performed by: NURSE ANESTHETIST, CERTIFIED REGISTERED

## 2022-10-12 PROCEDURE — 85025 COMPLETE CBC W/AUTO DIFF WBC: CPT | Performed by: OBSTETRICS & GYNECOLOGY

## 2022-10-12 PROCEDURE — 25010000002 KETOROLAC TROMETHAMINE PER 15 MG: Performed by: OBSTETRICS & GYNECOLOGY

## 2022-10-12 PROCEDURE — 25010000002 CEFAZOLIN IN DEXTROSE 2-4 GM/100ML-% SOLUTION: Performed by: OBSTETRICS & GYNECOLOGY

## 2022-10-12 PROCEDURE — 25010000002 ONDANSETRON PER 1 MG: Performed by: NURSE ANESTHETIST, CERTIFIED REGISTERED

## 2022-10-12 PROCEDURE — 51702 INSERT TEMP BLADDER CATH: CPT

## 2022-10-12 DEVICE — CLIP FALLOP FILSHIE TI PR STRL BX/20: Type: IMPLANTABLE DEVICE | Site: FALLOPIAN TUBE | Status: FUNCTIONAL

## 2022-10-12 RX ORDER — TRANEXAMIC ACID 10 MG/ML
1000 INJECTION, SOLUTION INTRAVENOUS ONCE AS NEEDED
Status: DISCONTINUED | OUTPATIENT
Start: 2022-10-12 | End: 2022-10-12 | Stop reason: HOSPADM

## 2022-10-12 RX ORDER — ALUMINA, MAGNESIA, AND SIMETHICONE 2400; 2400; 240 MG/30ML; MG/30ML; MG/30ML
15 SUSPENSION ORAL EVERY 4 HOURS PRN
Status: DISCONTINUED | OUTPATIENT
Start: 2022-10-12 | End: 2022-10-14 | Stop reason: HOSPADM

## 2022-10-12 RX ORDER — IBUPROFEN 600 MG/1
600 TABLET ORAL EVERY 6 HOURS
Status: DISCONTINUED | OUTPATIENT
Start: 2022-10-13 | End: 2022-10-14 | Stop reason: HOSPADM

## 2022-10-12 RX ORDER — CARBOPROST TROMETHAMINE 250 UG/ML
250 INJECTION, SOLUTION INTRAMUSCULAR AS NEEDED
Status: DISCONTINUED | OUTPATIENT
Start: 2022-10-12 | End: 2022-10-12 | Stop reason: HOSPADM

## 2022-10-12 RX ORDER — MEPERIDINE HYDROCHLORIDE 25 MG/ML
12.5 INJECTION INTRAMUSCULAR; INTRAVENOUS; SUBCUTANEOUS
Status: DISCONTINUED | OUTPATIENT
Start: 2022-10-12 | End: 2022-10-12 | Stop reason: HOSPADM

## 2022-10-12 RX ORDER — ONDANSETRON 2 MG/ML
4 INJECTION INTRAMUSCULAR; INTRAVENOUS EVERY 6 HOURS PRN
Status: DISCONTINUED | OUTPATIENT
Start: 2022-10-12 | End: 2022-10-14 | Stop reason: HOSPADM

## 2022-10-12 RX ORDER — DOCUSATE SODIUM 100 MG/1
100 CAPSULE, LIQUID FILLED ORAL 2 TIMES DAILY
Status: DISCONTINUED | OUTPATIENT
Start: 2022-10-12 | End: 2022-10-14 | Stop reason: HOSPADM

## 2022-10-12 RX ORDER — OXYCODONE HYDROCHLORIDE 5 MG/1
5 TABLET ORAL
Status: DISCONTINUED | OUTPATIENT
Start: 2022-10-12 | End: 2022-10-12 | Stop reason: HOSPADM

## 2022-10-12 RX ORDER — FAMOTIDINE 10 MG/ML
20 INJECTION, SOLUTION INTRAVENOUS ONCE AS NEEDED
Status: DISCONTINUED | OUTPATIENT
Start: 2022-10-12 | End: 2022-10-12 | Stop reason: HOSPADM

## 2022-10-12 RX ORDER — ACETAMINOPHEN 500 MG
1000 TABLET ORAL ONCE
Status: COMPLETED | OUTPATIENT
Start: 2022-10-12 | End: 2022-10-12

## 2022-10-12 RX ORDER — ONDANSETRON 2 MG/ML
INJECTION INTRAMUSCULAR; INTRAVENOUS AS NEEDED
Status: DISCONTINUED | OUTPATIENT
Start: 2022-10-12 | End: 2022-10-12 | Stop reason: SURG

## 2022-10-12 RX ORDER — CEFAZOLIN SODIUM 2 G/100ML
2 INJECTION, SOLUTION INTRAVENOUS ONCE
Status: COMPLETED | OUTPATIENT
Start: 2022-10-12 | End: 2022-10-12

## 2022-10-12 RX ORDER — DIPHENHYDRAMINE HYDROCHLORIDE 50 MG/ML
12.5 INJECTION INTRAMUSCULAR; INTRAVENOUS EVERY 6 HOURS PRN
Status: ACTIVE | OUTPATIENT
Start: 2022-10-12 | End: 2022-10-13

## 2022-10-12 RX ORDER — DIPHENHYDRAMINE HCL 25 MG
25 CAPSULE ORAL EVERY 6 HOURS PRN
Status: ACTIVE | OUTPATIENT
Start: 2022-10-12 | End: 2022-10-13

## 2022-10-12 RX ORDER — SODIUM CHLORIDE 0.9 % (FLUSH) 0.9 %
3 SYRINGE (ML) INJECTION EVERY 12 HOURS SCHEDULED
Status: DISCONTINUED | OUTPATIENT
Start: 2022-10-12 | End: 2022-10-12 | Stop reason: HOSPADM

## 2022-10-12 RX ORDER — ACETAMINOPHEN 325 MG/1
650 TABLET ORAL EVERY 6 HOURS
Status: DISCONTINUED | OUTPATIENT
Start: 2022-10-13 | End: 2022-10-12

## 2022-10-12 RX ORDER — SODIUM CHLORIDE, SODIUM LACTATE, POTASSIUM CHLORIDE, CALCIUM CHLORIDE 600; 310; 30; 20 MG/100ML; MG/100ML; MG/100ML; MG/100ML
150 INJECTION, SOLUTION INTRAVENOUS CONTINUOUS
Status: DISCONTINUED | OUTPATIENT
Start: 2022-10-12 | End: 2022-10-12

## 2022-10-12 RX ORDER — EPHEDRINE SULFATE 50 MG/ML
INJECTION, SOLUTION INTRAVENOUS AS NEEDED
Status: DISCONTINUED | OUTPATIENT
Start: 2022-10-12 | End: 2022-10-12 | Stop reason: SURG

## 2022-10-12 RX ORDER — CARBOPROST TROMETHAMINE 250 UG/ML
250 INJECTION, SOLUTION INTRAMUSCULAR ONCE
Status: DISCONTINUED | OUTPATIENT
Start: 2022-10-12 | End: 2022-10-12 | Stop reason: HOSPADM

## 2022-10-12 RX ORDER — TRISODIUM CITRATE DIHYDRATE AND CITRIC ACID MONOHYDRATE 500; 334 MG/5ML; MG/5ML
30 SOLUTION ORAL ONCE
Status: COMPLETED | OUTPATIENT
Start: 2022-10-12 | End: 2022-10-12

## 2022-10-12 RX ORDER — FAMOTIDINE 20 MG/1
20 TABLET, FILM COATED ORAL ONCE AS NEEDED
Status: DISCONTINUED | OUTPATIENT
Start: 2022-10-12 | End: 2022-10-12 | Stop reason: HOSPADM

## 2022-10-12 RX ORDER — METOCLOPRAMIDE HYDROCHLORIDE 5 MG/ML
10 INJECTION INTRAMUSCULAR; INTRAVENOUS
Status: COMPLETED | OUTPATIENT
Start: 2022-10-12 | End: 2022-10-12

## 2022-10-12 RX ORDER — SODIUM CHLORIDE 0.9 % (FLUSH) 0.9 %
10 SYRINGE (ML) INJECTION AS NEEDED
Status: DISCONTINUED | OUTPATIENT
Start: 2022-10-12 | End: 2022-10-12 | Stop reason: HOSPADM

## 2022-10-12 RX ORDER — METHYLERGONOVINE MALEATE 0.2 MG/ML
200 INJECTION INTRAVENOUS ONCE AS NEEDED
Status: DISCONTINUED | OUTPATIENT
Start: 2022-10-12 | End: 2022-10-12 | Stop reason: HOSPADM

## 2022-10-12 RX ORDER — FENTANYL CITRATE 50 UG/ML
INJECTION, SOLUTION INTRAMUSCULAR; INTRAVENOUS
Status: COMPLETED | OUTPATIENT
Start: 2022-10-12 | End: 2022-10-12

## 2022-10-12 RX ORDER — OXYTOCIN/0.9 % SODIUM CHLORIDE 30/500 ML
125 PLASTIC BAG, INJECTION (ML) INTRAVENOUS ONCE
Status: DISCONTINUED | OUTPATIENT
Start: 2022-10-12 | End: 2022-10-12 | Stop reason: HOSPADM

## 2022-10-12 RX ORDER — LIDOCAINE HYDROCHLORIDE 10 MG/ML
5 INJECTION, SOLUTION EPIDURAL; INFILTRATION; INTRACAUDAL; PERINEURAL AS NEEDED
Status: DISCONTINUED | OUTPATIENT
Start: 2022-10-12 | End: 2022-10-12 | Stop reason: HOSPADM

## 2022-10-12 RX ORDER — CALCIUM CARBONATE 200(500)MG
1 TABLET,CHEWABLE ORAL EVERY 4 HOURS PRN
Status: DISCONTINUED | OUTPATIENT
Start: 2022-10-12 | End: 2022-10-14 | Stop reason: HOSPADM

## 2022-10-12 RX ORDER — KETOROLAC TROMETHAMINE 15 MG/ML
15 INJECTION, SOLUTION INTRAMUSCULAR; INTRAVENOUS EVERY 6 HOURS
Status: COMPLETED | OUTPATIENT
Start: 2022-10-12 | End: 2022-10-13

## 2022-10-12 RX ORDER — PHENYLEPHRINE HCL IN 0.9% NACL 1 MG/10 ML
SYRINGE (ML) INTRAVENOUS AS NEEDED
Status: DISCONTINUED | OUTPATIENT
Start: 2022-10-12 | End: 2022-10-12 | Stop reason: SURG

## 2022-10-12 RX ORDER — ACETAMINOPHEN 500 MG
1000 TABLET ORAL EVERY 6 HOURS
Status: COMPLETED | OUTPATIENT
Start: 2022-10-12 | End: 2022-10-13

## 2022-10-12 RX ORDER — BUPIVACAINE HYDROCHLORIDE 7.5 MG/ML
INJECTION, SOLUTION EPIDURAL; RETROBULBAR
Status: COMPLETED | OUTPATIENT
Start: 2022-10-12 | End: 2022-10-12

## 2022-10-12 RX ORDER — NALOXONE HCL 0.4 MG/ML
0.4 VIAL (ML) INJECTION ONCE AS NEEDED
Status: ACTIVE | OUTPATIENT
Start: 2022-10-12 | End: 2022-10-13

## 2022-10-12 RX ORDER — MISOPROSTOL 200 UG/1
800 TABLET ORAL AS NEEDED
Status: DISCONTINUED | OUTPATIENT
Start: 2022-10-12 | End: 2022-10-12 | Stop reason: HOSPADM

## 2022-10-12 RX ORDER — PRENATAL VIT/IRON FUM/FOLIC AC 27MG-0.8MG
1 TABLET ORAL DAILY
Status: DISCONTINUED | OUTPATIENT
Start: 2022-10-12 | End: 2022-10-14 | Stop reason: HOSPADM

## 2022-10-12 RX ORDER — ONDANSETRON 4 MG/1
4 TABLET, FILM COATED ORAL EVERY 6 HOURS PRN
Status: DISCONTINUED | OUTPATIENT
Start: 2022-10-12 | End: 2022-10-12 | Stop reason: HOSPADM

## 2022-10-12 RX ORDER — BISACODYL 10 MG
10 SUPPOSITORY, RECTAL RECTAL DAILY PRN
Status: DISCONTINUED | OUTPATIENT
Start: 2022-10-12 | End: 2022-10-14 | Stop reason: HOSPADM

## 2022-10-12 RX ORDER — HYDROMORPHONE HCL 110MG/55ML
PATIENT CONTROLLED ANALGESIA SYRINGE INTRAVENOUS AS NEEDED
Status: DISCONTINUED | OUTPATIENT
Start: 2022-10-12 | End: 2022-10-12 | Stop reason: SURG

## 2022-10-12 RX ORDER — HYDROMORPHONE HCL 110MG/55ML
0.25 PATIENT CONTROLLED ANALGESIA SYRINGE INTRAVENOUS
Status: DISCONTINUED | OUTPATIENT
Start: 2022-10-12 | End: 2022-10-12 | Stop reason: HOSPADM

## 2022-10-12 RX ORDER — HYDROMORPHONE HCL 110MG/55ML
0.5 PATIENT CONTROLLED ANALGESIA SYRINGE INTRAVENOUS
Status: DISCONTINUED | OUTPATIENT
Start: 2022-10-12 | End: 2022-10-12 | Stop reason: HOSPADM

## 2022-10-12 RX ORDER — ONDANSETRON 2 MG/ML
4 INJECTION INTRAMUSCULAR; INTRAVENOUS EVERY 6 HOURS PRN
Status: DISCONTINUED | OUTPATIENT
Start: 2022-10-12 | End: 2022-10-12 | Stop reason: HOSPADM

## 2022-10-12 RX ORDER — OXYCODONE HYDROCHLORIDE 5 MG/1
10 TABLET ORAL EVERY 4 HOURS PRN
Status: DISCONTINUED | OUTPATIENT
Start: 2022-10-12 | End: 2022-10-14 | Stop reason: HOSPADM

## 2022-10-12 RX ORDER — ACETAMINOPHEN 325 MG/1
650 TABLET ORAL EVERY 6 HOURS
Status: DISCONTINUED | OUTPATIENT
Start: 2022-10-13 | End: 2022-10-14 | Stop reason: HOSPADM

## 2022-10-12 RX ORDER — MISOPROSTOL 100 UG/1
200 TABLET ORAL
Status: DISCONTINUED | OUTPATIENT
Start: 2022-10-12 | End: 2022-10-12 | Stop reason: HOSPADM

## 2022-10-12 RX ORDER — CETIRIZINE HYDROCHLORIDE 10 MG/1
10 TABLET ORAL DAILY
Status: DISCONTINUED | OUTPATIENT
Start: 2022-10-12 | End: 2022-10-14 | Stop reason: HOSPADM

## 2022-10-12 RX ORDER — OXYCODONE HYDROCHLORIDE 5 MG/1
5 TABLET ORAL EVERY 4 HOURS PRN
Status: DISCONTINUED | OUTPATIENT
Start: 2022-10-12 | End: 2022-10-14 | Stop reason: HOSPADM

## 2022-10-12 RX ORDER — ACETAMINOPHEN 500 MG
1000 TABLET ORAL EVERY 6 HOURS
Status: DISCONTINUED | OUTPATIENT
Start: 2022-10-12 | End: 2022-10-12

## 2022-10-12 RX ADMIN — ACETAMINOPHEN 1000 MG: 500 TABLET, FILM COATED ORAL at 15:21

## 2022-10-12 RX ADMIN — SODIUM CITRATE AND CITRIC ACID MONOHYDRATE 30 ML: 500; 334 SOLUTION ORAL at 07:12

## 2022-10-12 RX ADMIN — SODIUM CHLORIDE, POTASSIUM CHLORIDE, SODIUM LACTATE AND CALCIUM CHLORIDE: 600; 310; 30; 20 INJECTION, SOLUTION INTRAVENOUS at 07:20

## 2022-10-12 RX ADMIN — KETOROLAC TROMETHAMINE 15 MG: 15 INJECTION, SOLUTION INTRAMUSCULAR; INTRAVENOUS at 12:28

## 2022-10-12 RX ADMIN — Medication 100 MCG: at 07:30

## 2022-10-12 RX ADMIN — DOCUSATE SODIUM 100 MG: 100 CAPSULE, LIQUID FILLED ORAL at 21:19

## 2022-10-12 RX ADMIN — ONDANSETRON 4 MG: 2 INJECTION INTRAMUSCULAR; INTRAVENOUS at 07:56

## 2022-10-12 RX ADMIN — ONDANSETRON 4 MG: 2 INJECTION INTRAMUSCULAR; INTRAVENOUS at 07:20

## 2022-10-12 RX ADMIN — EPHEDRINE SULFATE 15 MG: 50 INJECTION INTRAVENOUS at 07:30

## 2022-10-12 RX ADMIN — METOCLOPRAMIDE HYDROCHLORIDE 10 MG: 5 INJECTION INTRAMUSCULAR; INTRAVENOUS at 07:13

## 2022-10-12 RX ADMIN — ACETAMINOPHEN 1000 MG: 500 TABLET, FILM COATED ORAL at 21:18

## 2022-10-12 RX ADMIN — BUPIVACAINE HYDROCHLORIDE 1.6 ML: 7.5 INJECTION, SOLUTION EPIDURAL; RETROBULBAR at 07:22

## 2022-10-12 RX ADMIN — Medication 100 MCG: at 07:45

## 2022-10-12 RX ADMIN — HYDROMORPHONE HYDROCHLORIDE 0.2 MG: 2 INJECTION, SOLUTION INTRAMUSCULAR; INTRAVENOUS; SUBCUTANEOUS at 07:28

## 2022-10-12 RX ADMIN — CEFAZOLIN SODIUM 2 G: 2 INJECTION, SOLUTION INTRAVENOUS at 07:12

## 2022-10-12 RX ADMIN — Medication 100 MCG: at 07:40

## 2022-10-12 RX ADMIN — ONDANSETRON 4 MG: 2 INJECTION INTRAMUSCULAR; INTRAVENOUS at 18:03

## 2022-10-12 RX ADMIN — OXYTOCIN 166.67 MILLI-UNITS/MIN: 10 INJECTION, SOLUTION INTRAMUSCULAR; INTRAVENOUS at 07:56

## 2022-10-12 RX ADMIN — Medication 100 MCG: at 07:34

## 2022-10-12 RX ADMIN — FENTANYL CITRATE 10 MCG: 50 INJECTION, SOLUTION INTRAMUSCULAR; INTRAVENOUS at 07:28

## 2022-10-12 RX ADMIN — ACETAMINOPHEN 1000 MG: 500 TABLET, FILM COATED ORAL at 07:12

## 2022-10-12 RX ADMIN — KETOROLAC TROMETHAMINE 15 MG: 15 INJECTION, SOLUTION INTRAMUSCULAR; INTRAVENOUS at 18:00

## 2022-10-12 RX ADMIN — SODIUM CHLORIDE, POTASSIUM CHLORIDE, SODIUM LACTATE AND CALCIUM CHLORIDE 1000 ML: 600; 310; 30; 20 INJECTION, SOLUTION INTRAVENOUS at 12:32

## 2022-10-12 NOTE — ANESTHESIA PROCEDURE NOTES
Spinal Block    Pre-sedation assessment completed: 10/12/2022 7:09 AM    Patient reassessed immediately prior to procedure    Patient location during procedure: OB  Start Time: 10/12/2022 7:22 AM  Stop Time: 10/12/2022 7:28 AM  Performed By  Anesthesiologist: Yaakov Santamaria MD CRNA/CAA: Gricel Gaona CRNA  Preanesthetic Checklist  Completed: patient identified, IV checked, site marked, risks and benefits discussed, surgical consent, monitors and equipment checked, pre-op evaluation and timeout performed  Spinal Block Prep:  Patient Position:sitting  Sterile Tech:cap, gloves, gown, mask and sterile barriers  Prep:Chloraprep  Patient Monitoring:blood pressure monitoring, continuous pulse oximetry and EKG    Spinal Block Procedure  Approach:midline  Guidance:landmark technique and palpation technique  Location:L3-L4  Needle Type:Pencan  Needle Gauge:24 G  Placement of Spinal needle event:cerebrospinal fluid aspirated  Paresthesia: no  Fluid Appearance:clear  Medications: bupivacaine PF (MARCAINE) injection 0.75% - Intrathecal   1.6 mL - 10/12/2022 7:22:00 AM  fentaNYL (SUBLIMAZE) injection - Intrathecal   10 mcg - 10/12/2022 7:28:00 AM   Post Assessment  Patient Tolerance:patient tolerated the procedure well with no apparent complications  Complications no  Additional Notes  Dilaudid 200 mcg also added to SAB

## 2022-10-12 NOTE — INTERVAL H&P NOTE
H&P reviewed. The patient was examined and there are no changes to the H&P.    FHR: 120  Variability: Moderate  Accelerations: present  Decelerations: absent  Contractions: Irregular    Interpretation: Category 1

## 2022-10-12 NOTE — ANESTHESIA PREPROCEDURE EVALUATION
Anesthesia Evaluation     Patient summary reviewed and Nursing notes reviewed   no history of anesthetic complications:  NPO Solid Status: > 8 hours  NPO Liquid Status: > 2 hours           Airway   Mallampati: II  TM distance: >3 FB  Neck ROM: full  No difficulty expected  Dental      Pulmonary - negative pulmonary ROS and normal exam    breath sounds clear to auscultation  Cardiovascular - negative cardio ROS and normal exam  Exercise tolerance: good (4-7 METS)    Rhythm: regular  Rate: normal        Neuro/Psych- negative ROS  GI/Hepatic/Renal/Endo - negative ROS     Musculoskeletal (-) negative ROS    Abdominal    Substance History - negative use     OB/GYN    (+) Pregnant,         Other - negative ROS       ROS/Med Hx Other: PAT Nursing Notes unavailable.                   Anesthesia Plan    ASA 2     spinal       Anesthetic plan, risks, benefits, and alternatives have been provided, discussed and informed consent has been obtained with: patient and spouse/significant other.        CODE STATUS:    Level Of Support Discussed With: Patient  Code Status (Patient has no pulse and is not breathing): CPR (Attempt to Resuscitate)  Medical Interventions (Patient has pulse or is breathing): Full

## 2022-10-12 NOTE — OP NOTE
[facility]   Section Operative Note    Pre-Operative Dx:   1.  IUP at Gestational Age: 39w1d  weeks    2.  Prior  delivery  3.  Unwanted fertility      Postoperative dx:    1.  Same  2.  Pelvic adhesions     Procedure: Procedure(s):   SECTION REPEAT WITH TUBAL   Surgeon/Assistant: Surgeon(s):  Dragan Jones MD         Anesthesia:  Anesthesiologist: Choice  Anesthesiologist: Yaakov Santamaria MD  CRNA: Gricel Gaona CRNA     EBL: 800 mls.                  Antibiotics: cefazolin (Ancef)     Infant:           Gender: female  infant    Weight: 3260 g (7 lb 3 oz)     Apgars: 8  @ 1 minute /     9  @ 5 minutes    Cord gases: Venous:  No results found for: PHCVEN     Arterial:  No results found for: PHCART     Indication for C/Section:  Prior C/S         Priority for C/Section:  routine      Procedure Details:   After reviewing the informed consent, the patient expressed her understanding and desire to proceed.  She was taken to the operating room with an IV in place and running.  She was placed on the operating table in the sitting position.  The patient was given a spinal anesthetic.  She was placed in the dorsal supine position with leftward tilt.  A sterile Mccoy catheter was inserted into the patient's bladder.  The patient was then prepped and draped in the usual sterile fashion. After a surgical timeout was performed, and the patient's anesthesia was found to be adequate I made a Pfannenstiel skin incision with the scalpel.  The incision was carried down to the underlying fascia with the cautery. The fascia was nicked in the midline. The fascia was extended laterally, in a curvilinear fashion with Salter scissors.  I used 2 kochers to grasp the superior portion of the fascia, and this was taken off the rectus muscle sharply.  The kochers were removed and replaced on the posterior portion of the fascia. The fascia was taken off the rectus muscle sharply.  The midline was  identified, tented up with 2 hemostats, and entered sharply.  The peritoneum was extended in a sharp fashion with good visualization of the bladder.  Bladder was adhered fairly high on the anterior lower segment.  I tediously took this down.  The bladder blade was placed.  The bladder flap was developed sharply further.  I made a low transverse uterine incision with the scalpel.  The uterine cavity was entered bluntly, and membranes were artificially ruptured.  The fluid color was clear.  The fetal head was gently lifted to the hysterotomy, and the baby was delivered with gentle fundal pressure at 7:56 AM.  After complete delivery of the infant, the mouth and nose were bulb suctioned, the cord was doubly clamped and cut, and the baby was passed off to the awaiting nurses. Apgars were 8 and 9 at 1 and 5 minutes respectively. The baby's birth weight was 7 pounds 3 ounces. Cord blood and gases were collected, and the placenta was delivered manually and intact at 7:57 AM.  The uterus was then exteriorized, and the endometrium was curetted with a dry lap.  The hysterotomy was then closed in 2 layers.  The first layer was closed with a running 0 Monocryl suture.  The second layer was closed with a horizontal imbricating 0 Monocryl suture.  There was excellent hemostasis after closure of both layers.  Next I proceeded with the sterilization. I placed a Filsche clip across each fallopian tube, approximately 1 cm from the cornual region of the uterus. This occluded both fallopian tubes. I irrigated posterior to the uterus.  The uterus was reinserted into the peritoneal cavity.  The paracolic gutters were copiously irrigated.  The hysterotomy was reinspected, and noted to be hemostatic.  3 Selma clamps were used to grasp the peritoneum, and the peritoneum was closed with a 3-0 Monocryl suture in a running fashion.  The rectus muscle was reapproximated with 3 interrupted horizontal mattress sutures using a #1 chromic suture.   The fascia was then closed in a running fashion using a 0 Vicryl suture.  The subcutaneous spaces were copiously irrigated, and hemostasis was achieved with the cautery.  The subcutaneous space was then reapproximated with a 3-0 Monocryl suture in a running fashion.  The skin was closed with a 4-0 Monocryl suture in a subcuticular fashion.  A sterile towels applied over the incision.  The patient was placed in a frog-leg position, and the drapes were removed.  I expressed any remaining blood and clot from the uterus.  A sterile bandage was applied to the incision, and the patient was transferred to the recovery room in satisfactory condition.  The patient received Kefzol as her preoperative antibiotics.  All counts were correct x2 at the end of the procedure.      Complications:   None                   Dragan Jones MD  10/12/2022  08:43 EDT

## 2022-10-13 LAB
BASOPHILS # BLD AUTO: 0.02 10*3/MM3 (ref 0–0.2)
BASOPHILS NFR BLD AUTO: 0.2 % (ref 0–1.5)
DEPRECATED RDW RBC AUTO: 45.1 FL (ref 37–54)
EOSINOPHIL # BLD AUTO: 0.01 10*3/MM3 (ref 0–0.4)
EOSINOPHIL NFR BLD AUTO: 0.1 % (ref 0.3–6.2)
ERYTHROCYTE [DISTWIDTH] IN BLOOD BY AUTOMATED COUNT: 12.7 % (ref 12.3–15.4)
HCT VFR BLD AUTO: 30.3 % (ref 34–46.6)
HGB BLD-MCNC: 10.8 G/DL (ref 12–15.9)
IMM GRANULOCYTES # BLD AUTO: 0.03 10*3/MM3 (ref 0–0.05)
IMM GRANULOCYTES NFR BLD AUTO: 0.3 % (ref 0–0.5)
LYMPHOCYTES # BLD AUTO: 1.31 10*3/MM3 (ref 0.7–3.1)
LYMPHOCYTES NFR BLD AUTO: 13.2 % (ref 19.6–45.3)
MCH RBC QN AUTO: 34.8 PG (ref 26.6–33)
MCHC RBC AUTO-ENTMCNC: 35.6 G/DL (ref 31.5–35.7)
MCV RBC AUTO: 97.7 FL (ref 79–97)
MONOCYTES # BLD AUTO: 0.64 10*3/MM3 (ref 0.1–0.9)
MONOCYTES NFR BLD AUTO: 6.4 % (ref 5–12)
NEUTROPHILS NFR BLD AUTO: 7.92 10*3/MM3 (ref 1.7–7)
NEUTROPHILS NFR BLD AUTO: 79.8 % (ref 42.7–76)
NRBC BLD AUTO-RTO: 0 /100 WBC (ref 0–0.2)
PLATELET # BLD AUTO: 223 10*3/MM3 (ref 140–450)
PMV BLD AUTO: 9.4 FL (ref 6–12)
RBC # BLD AUTO: 3.1 10*6/MM3 (ref 3.77–5.28)
WBC NRBC COR # BLD: 9.93 10*3/MM3 (ref 3.4–10.8)

## 2022-10-13 PROCEDURE — 25010000002 KETOROLAC TROMETHAMINE PER 15 MG: Performed by: OBSTETRICS & GYNECOLOGY

## 2022-10-13 PROCEDURE — 85025 COMPLETE CBC W/AUTO DIFF WBC: CPT | Performed by: OBSTETRICS & GYNECOLOGY

## 2022-10-13 PROCEDURE — 0503F POSTPARTUM CARE VISIT: CPT | Performed by: OBSTETRICS & GYNECOLOGY

## 2022-10-13 RX ADMIN — ACETAMINOPHEN 650 MG: 325 TABLET ORAL at 16:00

## 2022-10-13 RX ADMIN — KETOROLAC TROMETHAMINE 15 MG: 15 INJECTION, SOLUTION INTRAMUSCULAR; INTRAVENOUS at 01:19

## 2022-10-13 RX ADMIN — PRENATAL WITH FERROUS FUM AND FOLIC ACID 1 TABLET: 3080; 920; 120; 400; 22; 1.84; 3; 20; 10; 1; 12; 200; 27; 25; 2 TABLET ORAL at 09:30

## 2022-10-13 RX ADMIN — KETOROLAC TROMETHAMINE 15 MG: 15 INJECTION, SOLUTION INTRAMUSCULAR; INTRAVENOUS at 06:12

## 2022-10-13 RX ADMIN — IBUPROFEN 600 MG: 600 TABLET, FILM COATED ORAL at 12:30

## 2022-10-13 RX ADMIN — ACETAMINOPHEN 650 MG: 325 TABLET ORAL at 20:59

## 2022-10-13 RX ADMIN — CETIRIZINE HYDROCHLORIDE 10 MG: 10 TABLET, FILM COATED ORAL at 09:30

## 2022-10-13 RX ADMIN — DOCUSATE SODIUM 100 MG: 100 CAPSULE, LIQUID FILLED ORAL at 20:59

## 2022-10-13 RX ADMIN — IBUPROFEN 600 MG: 600 TABLET, FILM COATED ORAL at 18:00

## 2022-10-13 RX ADMIN — DOCUSATE SODIUM 100 MG: 100 CAPSULE, LIQUID FILLED ORAL at 09:30

## 2022-10-13 RX ADMIN — ACETAMINOPHEN 1000 MG: 500 TABLET, FILM COATED ORAL at 04:15

## 2022-10-13 RX ADMIN — IBUPROFEN 600 MG: 600 TABLET, FILM COATED ORAL at 23:04

## 2022-10-13 RX ADMIN — ACETAMINOPHEN 650 MG: 325 TABLET ORAL at 09:30

## 2022-10-13 NOTE — PLAN OF CARE
Goal Outcome Evaluation:           Progress: improving  Outcome Evaluation: Patient has showered and is up independently, voiding spontaneously, bleeding is scant, and minimal complaints of pain

## 2022-10-13 NOTE — PROGRESS NOTES
LIAT Burger   PROGRESS NOTE    Post-Op Day 1 S/P     Subjective:  Patient has no complaints  Pain controlled  Tolerating a regular diet  Passing flatus  Ambulating  Urinating spontaneously  Lochia decreasing, no bleeding concerns  Denies HA, vision change, or RUQ/epigastric pain  No lightheadedness or dizziness    Objective    Objective     Temp:  [97.5 °F (36.4 °C)-98.6 °F (37 °C)] 97.5 °F (36.4 °C)  Heart Rate:  [60-76] 62  Resp:  [16-25] 16  BP: ()/(50-75) 115/57     Physical Exam:  GEN: alert and in no distress  Abdomen: fundus firm - below the umbilicus  abdomen soft + BS's  The abdomen is appropriately tender to palpation around the incision  Incision: dressed, and the bandage is clean and dry  Extremi1+ edema, no redness or tenderness in the calves or thighsties:     Labs:  Lab Results (last 24 hours)     Procedure Component Value Units Date/Time    CBC & Differential [[id number]]  (Abnormal) Collected: 10/13/22 0743    Specimen: Blood Updated: 10/13/22 0751    Narrative:      The following orders were created for panel order CBC & Differential.  Procedure                               Abnormality         Status                     ---------                               -----------         ------                     CBC Auto Differential[[id number]]        Abnormal            Final result                 Please view results for these tests on the individual orders.    CBC Auto Differential [[id number]]  (Abnormal) Collected: 10/13/22 0743    Specimen: Blood Updated: 10/13/22 0751     WBC 9.93 10*3/mm3      RBC 3.10 10*6/mm3      Hemoglobin 10.8 g/dL      Hematocrit 30.3 %      MCV 97.7 fL      MCH 34.8 pg      MCHC 35.6 g/dL      RDW 12.7 %      RDW-SD 45.1 fl      MPV 9.4 fL      Platelets 223 10*3/mm3      Neutrophil % 79.8 %      Lymphocyte % 13.2 %      Monocyte % 6.4 %      Eosinophil % 0.1 %      Basophil % 0.2 %      Immature Grans % 0.3 %      Neutrophils, Absolute 7.92 10*3/mm3       Lymphocytes, Absolute 1.31 10*3/mm3      Monocytes, Absolute 0.64 10*3/mm3      Eosinophils, Absolute 0.01 10*3/mm3      Basophils, Absolute 0.02 10*3/mm3      Immature Grans, Absolute 0.03 10*3/mm3      nRBC 0.0 /100 WBC              Assessment & Plan         delivery delivered    Hx of  section    Multigravida of advanced maternal age in third trimester    Unwanted fertility    Previous  delivery affecting pregnancy    Assessment & Plan    Assessment:    Lissa Guardado is Day 1  post-partum  , Low Transverse   .      Plan:    Routine postpartum/postop care    Ambulate, Remove IV, Remove bandage, Shower, PO pain meds, Importance of wound care/keep clean and dry, Breast feeding support    Probable DC in a.m.    Dragan Jones MD  10/13/22  09:26 EDT

## 2022-10-13 NOTE — DISCHARGE SUMMARY
[facility]         DISCHARGE SUMMARY    Patient Name: Lissa Guardado  : 1985  MRN: [id number]    Date of Admission: 10/12/2022  Date of Discharge: 10/14/2020  Primary Care Physician: Stacy Lopez APRN    Consults     No orders found from 2022 to 10/13/2022.           Procedures:  Repeat low-transverse  delivery with bilateral tubal occlusion    Presenting Problem:   Previous  delivery affecting pregnancy [O34.219]    Admitting Diagnosis:  37-year-old  4 para 1 at 39 weeks and 1 day gestation  Previous  delivery  Unwanted fertility  AMA    Discharge Diagnosis:  37-year-old  4 para 1 at 39 weeks and 1 day gestation  Previous  delivery  Unwanted fertility  AMA  Elick adhesions    Delivery Summary     OB Surgeon:  Dragan Jones MD  Anesthesia: Spinal  Delivery Type:  LTCS, BTL Filshie clips  Perineum: OBPERINEUM: NA  Feeding method: Breast    Infant: female  infant;    Weight: 3260 g (7 lb 3 oz)     APGARS: 8  @ 1 minute / 9  @ 5 minutes   Venous Blood Gas:   pH, Cord Venous   Date Value Ref Range Status   10/12/2022 7.352 7.310 - 7.370 pH Units Final      Arterial Blood Gas: [facility]     H[facility] Course     Hospital Course:  Lissa Guardado is a 37 y.o.  39w1d who presented on 10/12/2022 for a scheduled repeat  delivery with tubal ligation.  For full details of that procedure please see the separate dictated operative narrative.  After initial recovery in the PACU the patient was transferred to the postpartum unit for further postpartum care.  Her Mccoy catheter was discontinued on the afternoon of surgery.  She was at a void without difficulty.  By postop day 1 she was doing well.  Her vital signs remained stable.  She was afebrile.  Her postoperative labs were appropriate.  Her lochia was appropriate.  By postop day 2, day of discharge she continued to do well.  She been afebrile stable vital  signs throughout the hospital stay.  Her pain was well controlled.  She was ambulating without difficulty.  She was voiding without difficulty.  She was tolerating a regular diet without nausea or vomiting.  Her lochia was appropriate.  Her exam was benign.  Her incision was clean, dry, intact and well approximated and without signs of infection.  She desired discharge home.    Day of Discharge     Vital Signs:  Temp:  [98.1 °F (36.7 °C)-98.6 °F (37 °C)] 98.1 °F (36.7 °C)  Heart Rate:  [56-75] 56  Resp:  [16] 16  BP: (100-114)/(47-58) 102/51    Pertinent  and/or Most Recent Results     LAB RESULTS:       Lab 10/13/22  0743 10/12/22  0612   WBC 9.93 8.19   HEMOGLOBIN 10.8* 13.1   HEMATOCRIT 30.3* 35.6   PLATELETS 223 288   NEUTROS ABS 7.92* 5.58   IMMATURE GRANS (ABS) 0.03 0.06*   LYMPHS ABS 1.31 1.97   MONOS ABS 0.64 0.51   EOS ABS 0.01 0.05   MCV 97.7* 94.7                 Lab 10/12/22  0612   ABO TYPING O   RH TYPING Positive   ANTIBODY SCREEN Negative     URINALYSIS@  Microbiology Results (last 10 days)     ** No results found for the last 240 hours. **             Discharge Details        Discharge Medications      New Medications      Instructions Start Date   docusate sodium 100 MG capsule  Commonly known as: Colace   100 mg, Oral, 2 Times Daily      HYDROcodone-acetaminophen 5-325 MG per tablet  Commonly known as: Norco   1-2 tablets, Oral, Every 6 Hours PRN      ibuprofen 600 MG tablet  Commonly known as: ADVIL,MOTRIN   600 mg, Oral, Every 6 Hours PRN         Continue These Medications      Instructions Start Date   clindamycin 1 % lotion  Commonly known as: CLEOCIN T   APPLY TOPICALLY TO FACE DAILY AS NEEDED FOR ACNE      Loratadine 10 MG capsule   10 mg, Oral      Prenatal Vitamin 27-0.8 MG tablet   1 tablet, Oral, Daily             No Known Allergies    Discharge Disposition:   Home, self-care    Discharge Condition:  Good    Diet:   Regular    Discharge Activity:   Pelvic rest for 6 weeks, no intercourse  for 6 weeks, no tampons or douching for 6 weeks, nothing in the vagina for 6 weeks  No driving for 2 weeks  No lifting more than 15 to 20 pounds for 2 weeks  No tub baths for 2 weeks, but may shower  Keep the incision clean and dry    Call the office or go to the emergency department for temperature greater than 100 °F, shortness of breath or chest pain, excessive nausea or vomiting, pain that is worsening despite current pain medications, redness, swelling, or drainage from the incision site, vaginal bleeding soaking a pad in less than 1 hour.    Follow Up:  Future Appointments   Date Time Provider Department Center   11/15/2022  9:00 AM Dragan Jones MD [facility]       Electronically signed by Dragan Jones MD, 10/13/22, 4:32 PM EDT.

## 2022-10-14 VITALS
OXYGEN SATURATION: 97 % | HEIGHT: 64 IN | TEMPERATURE: 97.4 F | RESPIRATION RATE: 18 BRPM | WEIGHT: 189 LBS | BODY MASS INDEX: 32.27 KG/M2 | SYSTOLIC BLOOD PRESSURE: 116 MMHG | HEART RATE: 75 BPM | DIASTOLIC BLOOD PRESSURE: 64 MMHG

## 2022-10-14 PROCEDURE — 0503F POSTPARTUM CARE VISIT: CPT | Performed by: OBSTETRICS & GYNECOLOGY

## 2022-10-14 PROCEDURE — 90686 IIV4 VACC NO PRSV 0.5 ML IM: CPT | Performed by: OBSTETRICS & GYNECOLOGY

## 2022-10-14 PROCEDURE — 25010000002 INFLUENZA VAC SPLIT QUAD 0.5 ML SUSPENSION PREFILLED SYRINGE: Performed by: OBSTETRICS & GYNECOLOGY

## 2022-10-14 PROCEDURE — G0008 ADMIN INFLUENZA VIRUS VAC: HCPCS | Performed by: OBSTETRICS & GYNECOLOGY

## 2022-10-14 RX ORDER — IBUPROFEN 600 MG/1
600 TABLET ORAL EVERY 6 HOURS PRN
Qty: 60 TABLET | Refills: 1 | Status: SHIPPED | OUTPATIENT
Start: 2022-10-14

## 2022-10-14 RX ORDER — DOCUSATE SODIUM 100 MG/1
100 CAPSULE, LIQUID FILLED ORAL 2 TIMES DAILY
Qty: 60 CAPSULE | Refills: 1 | Status: SHIPPED | OUTPATIENT
Start: 2022-10-14

## 2022-10-14 RX ORDER — HYDROCODONE BITARTRATE AND ACETAMINOPHEN 5; 325 MG/1; MG/1
1-2 TABLET ORAL EVERY 6 HOURS PRN
Qty: 20 TABLET | Refills: 0 | Status: SHIPPED | OUTPATIENT
Start: 2022-10-14 | End: 2022-11-15

## 2022-10-14 RX ADMIN — DOCUSATE SODIUM 100 MG: 100 CAPSULE, LIQUID FILLED ORAL at 08:04

## 2022-10-14 RX ADMIN — PRENATAL WITH FERROUS FUM AND FOLIC ACID 1 TABLET: 3080; 920; 120; 400; 22; 1.84; 3; 20; 10; 1; 12; 200; 27; 25; 2 TABLET ORAL at 08:04

## 2022-10-14 RX ADMIN — ACETAMINOPHEN 650 MG: 325 TABLET ORAL at 03:44

## 2022-10-14 RX ADMIN — CETIRIZINE HYDROCHLORIDE 10 MG: 10 TABLET, FILM COATED ORAL at 09:08

## 2022-10-14 RX ADMIN — INFLUENZA VIRUS VACCINE 0.5 ML: 15; 15; 15; 15 SUSPENSION INTRAMUSCULAR at 08:05

## 2022-10-14 RX ADMIN — ACETAMINOPHEN 650 MG: 325 TABLET ORAL at 08:04

## 2022-10-14 RX ADMIN — IBUPROFEN 600 MG: 600 TABLET, FILM COATED ORAL at 05:55

## 2022-10-14 NOTE — PLAN OF CARE
Problem: Adult Inpatient Plan of Care  Goal: Plan of Care Review  Outcome: Ongoing, Progressing  Flowsheets  Taken 10/13/2022 1818 by Polly Baum RN  Progress: improving  Taken 10/12/2022 1718 by Radha Calle RN  Plan of Care Reviewed With: patient  Goal: Patient-Specific Goal (Individualized)  Outcome: Ongoing, Progressing  Goal: Absence of Hospital-Acquired Illness or Injury  Outcome: Ongoing, Progressing  Intervention: Identify and Manage Fall Risk  Recent Flowsheet Documentation  Taken 10/14/2022 0344 by Florinda De La Vega RN  Safety Promotion/Fall Prevention: safety round/check completed  Taken 10/13/2022 2304 by Florinda De La Vega RN  Safety Promotion/Fall Prevention: safety round/check completed  Taken 10/13/2022 2100 by Florinda De La Vega RN  Safety Promotion/Fall Prevention: safety round/check completed  Intervention: Prevent Skin Injury  Recent Flowsheet Documentation  Taken 10/13/2022 2100 by Florinda De La Vega RN  Body Position: position changed independently  Intervention: Prevent and Manage VTE (Venous Thromboembolism) Risk  Recent Flowsheet Documentation  Taken 10/13/2022 2100 by Florinda De La Vega RN  Activity Management: up ad artemio  VTE Prevention/Management:   bilateral   compression stockings on  Intervention: Prevent Infection  Recent Flowsheet Documentation  Taken 10/13/2022 2100 by Florinda De La Vega RN  Infection Prevention:   hand hygiene promoted   rest/sleep promoted  Goal: Optimal Comfort and Wellbeing  Outcome: Ongoing, Progressing  Intervention: Monitor Pain and Promote Comfort  Recent Flowsheet Documentation  Taken 10/13/2022 2100 by Florinda De La Vega RN  Pain Management Interventions:   see MAR   pain management plan reviewed with patient/caregiver   quiet environment facilitated  Intervention: Provide Person-Centered Care  Recent Flowsheet Documentation  Taken 10/13/2022 2100 by Florinda De La Vega RN  Trust Relationship/Rapport:   care explained   choices provided   questions  answered   questions encouraged   thoughts/feelings acknowledged  Goal: Readiness for Transition of Care  Outcome: Ongoing, Progressing     Problem: Bleeding ( Delivery)  Goal: Bleeding is Controlled  Outcome: Ongoing, Progressing     Problem: Infection ( Delivery)  Goal: Absence of Infection Signs and Symptoms  Outcome: Ongoing, Progressing  Intervention: Minimize Infection Risk  Recent Flowsheet Documentation  Taken 10/13/2022 2100 by Florinda De La Vega RN  Infection Prevention:   hand hygiene promoted   rest/sleep promoted     Problem: Breastfeeding  Goal: Effective Breastfeeding  Outcome: Ongoing, Progressing  Intervention: Promote Breast Care and Comfort  Recent Flowsheet Documentation  Taken 10/13/2022 2100 by Florinda De La Vega RN  Breast Care: Breastfeeding: lanolin to nipples  Intervention: Support Exclusive Breastfeeding Success  Recent Flowsheet Documentation  Taken 10/13/2022 2100 by Florinda De La Vega RN  Breastfeeding Support: maternal hydration promoted  Intervention: Promote Effective Breastfeeding  Recent Flowsheet Documentation  Taken 10/13/2022 2100 by Florinda De La Vega RN  Breastfeeding Assistance: support offered     Goal Outcome Evaluation: Progressing appropriately

## 2022-10-14 NOTE — LACTATION NOTE
LC in to follow up with breastfeeding progress. Patient states baby had a low blood sugar through the night and needed some supplementation. LC discussed that she may need to have some supplementation on hand to use as needed. Her breasts are not feeling barnett today. Infant's weight loss is now 7.8% but output is wdl. Patient is planning on discharge today. LC discussed normal infant output patterns to expect and unlimited time/access to the breast but if infant is not waking by 3 hours to wake and feed using measures shown in the hospital. LC discussed checking to make sure new medications are safe to breastfeed. LC discussed alcohol use and cigarette/second hand smoke around baby and breastfeeding and discussed the impact of street drugs on infants and breastfeeding. LC used the page in the breastfeeding guide to discuss harmful effects of these. Breastfeeding/Lactation expectations and anticipatory guidance discussed for the next two weeks . LC discussed nipple care, plugged ducts, engorgement, and breast infection. LC encouraged mom to see pediatrician two days from discharge for follow up. Patient has a breastpump for home use and LC discussed good pumping guidelines and normal expectations with pumping and storage and preparation of ebm for feedings. LC discussed breastfeeding/lactation resources after discharge and when to call the doctor. Patient showed good understanding.

## 2022-10-14 NOTE — PROGRESS NOTES
"PostPartum/PostOp PROGRESS NOTE        Subjective:  Pain controlled  Tolerating a regular diet  Passing flatus  Ambulating  Urinating spontaneously  Lochia decreasing, no bleeding concerns  Denies HA, vision change, or RUQ/epigastric pain  No lightheadedness or dizziness  Desires DC home if baby Dc'd    Objective:  Vitals: Blood pressure 102/51, pulse 56, temperature 98.1 °F (36.7 °C), temperature source Oral, resp. rate 16, height 162.6 cm (64.02\"), weight 85.7 kg (189 lb), last menstrual period 01/11/2022, SpO2 97 %, currently breastfeeding.          General: NAD, alert and oriented, appropriate  Psych: Normal mood, appropriate  Abdomen: Fundus at U-2, the abdomen is appropriately tender to palpation around the incision.   Incision: The incision is clean, dry and intact. There is no erythema, swelling or drainage  Extremeties: Trace edema    Labs:  Lab Results (last 24 hours)     Procedure Component Value Units Date/Time    CBC & Differential [[id number]]  (Abnormal) Collected: 10/13/22 0743    Specimen: Blood Updated: 10/13/22 0751    Narrative:      The following orders were created for panel order CBC & Differential.  Procedure                               Abnormality         Status                     ---------                               -----------         ------                     CBC Auto Differential[[id number]]        Abnormal            Final result                 Please view results for these tests on the individual orders.    CBC Auto Differential [[id number]]  (Abnormal) Collected: 10/13/22 0743    Specimen: Blood Updated: 10/13/22 0751     WBC 9.93 10*3/mm3      RBC 3.10 10*6/mm3      Hemoglobin 10.8 g/dL      Hematocrit 30.3 %      MCV 97.7 fL      MCH 34.8 pg      MCHC 35.6 g/dL      RDW 12.7 %      RDW-SD 45.1 fl      MPV 9.4 fL      Platelets 223 10*3/mm3      Neutrophil % 79.8 %      Lymphocyte % 13.2 %      Monocyte % 6.4 %      Eosinophil % 0.1 %      Basophil % 0.2 %      Immature Grans % " 0.3 %      Neutrophils, Absolute 7.92 10*3/mm3      Lymphocytes, Absolute 1.31 10*3/mm3      Monocytes, Absolute 0.64 10*3/mm3      Eosinophils, Absolute 0.01 10*3/mm3      Basophils, Absolute 0.02 10*3/mm3      Immature Grans, Absolute 0.03 10*3/mm3      nRBC 0.0 /100 WBC             Assessment:    Post-partum/postop Day:  2  Status post c/s      Plan:   Routine postpartum/postop care  Ambulate, Shower, PO pain meds, Importance of wound care/keep clean and dry, Breast feeding support, Discharge home, DC meds reviewed, Follow up scheduled, PP/PO precautions given      Electronically signed by Dragan Jones MD, 10/14/22, 6:42 AM EDT.

## 2022-10-26 NOTE — ANESTHESIA POSTPROCEDURE EVALUATION
Patient: Lissa Guardado    Procedure Summary     Date: 10/12/22 Room / Location: [facility] LABOR DELIVERY  [facility] LABOR DELIVERY    Anesthesia Start: 720 Anesthesia Stop: 833    Procedure:  SECTION REPEAT WITH TUBAL (Bilateral: Abdomen) Diagnosis:     Surgeons: Dragan Jones MD Provider: Yaakov Santamaria MD    Anesthesia Type: spinal ASA Status: 2          Anesthesia Type: spinal    Vitals  Vitals Value Taken Time   /64 10/14/22 0900   Temp 36.3 °C (97.4 °F) 10/14/22 0900   Pulse 75 10/14/22 0900   Resp 18 10/14/22 0900   SpO2 97 % 10/12/22 1030           Post Anesthesia Care and Evaluation    No anesthesia care post op

## 2022-10-26 NOTE — ANESTHESIA POSTPROCEDURE EVALUATION
Patient: Lissa Guardado    Procedure Summary     Date: 10/12/22 Room / Location: [facility] LABOR DELIVERY  [facility] LABOR DELIVERY    Anesthesia Start: 720 Anesthesia Stop: 833    Procedure:  SECTION REPEAT WITH TUBAL (Bilateral: Abdomen) Diagnosis:     Surgeons: Dragan Jones MD Provider: Yaakov Santamaria MD    Anesthesia Type: spinal ASA Status: 2          Anesthesia Type: spinal    Vitals  Vitals Value Taken Time   /64 10/14/22 0900   Temp 36.3 °C (97.4 °F) 10/14/22 0900   Pulse 75 10/14/22 0900   Resp 18 10/14/22 0900   SpO2 97 % 10/12/22 1030           Anesthesia Post Evaluation

## 2022-10-26 NOTE — ANESTHESIA POSTPROCEDURE EVALUATION
Patient: Lissa Guardado    Procedure Summary     Date: 10/12/22 Room / Location: [facility] LABOR DELIVERY  [facility] LABOR DELIVERY    Anesthesia Start: 720 Anesthesia Stop: 833    Procedure:  SECTION REPEAT WITH TUBAL (Bilateral: Abdomen) Diagnosis:     Surgeons: Dragan Jones MD Provider: Yaakov Santamaria MD    Anesthesia Type: spinal ASA Status: 2          Anesthesia Type: spinal    Vitals  Vitals Value Taken Time   /64 10/14/22 0900   Temp 36.3 °C (97.4 °F) 10/14/22 0900   Pulse 75 10/14/22 0900   Resp 18 10/14/22 0900   SpO2 97 % 10/12/22 1030           Anesthesia Post Evaluation

## 2022-10-31 ENCOUNTER — TELEPHONE (OUTPATIENT)
Dept: LACTATION | Facility: HOSPITAL | Age: 37
End: 2022-10-31

## 2022-10-31 NOTE — TELEPHONE ENCOUNTER
Pt states she is now exclusively pumping, she is getting about 4-8oz each pumping. Baby is above birth wt. She has no questions or concerns at this time, she was encouraged to reach out to LC as needed.

## 2022-11-14 PROBLEM — O09.523 MULTIGRAVIDA OF ADVANCED MATERNAL AGE IN THIRD TRIMESTER: Status: RESOLVED | Noted: 2022-03-30 | Resolved: 2022-11-14

## 2022-11-14 PROBLEM — Z30.09 UNWANTED FERTILITY: Status: RESOLVED | Noted: 2022-09-07 | Resolved: 2022-11-14

## 2022-11-14 PROBLEM — O34.219 PREVIOUS CESAREAN DELIVERY AFFECTING PREGNANCY: Status: RESOLVED | Noted: 2022-10-12 | Resolved: 2022-11-14

## 2022-11-14 PROBLEM — Z98.891 HX OF CESAREAN SECTION: Status: RESOLVED | Noted: 2022-03-30 | Resolved: 2022-11-14

## 2022-11-14 NOTE — PROGRESS NOTES
"POSTPARTUM Follow Up Visit    CC:  Postpartum     HPI:      Antepartum or Postpartum complications: AMA, Prior   Delivery type:   LTCS, BTL Filshie clips  Perineum: NA  Feeding: Breast     Pain:  No  Vaginal Bleeding:  Yes, spotting  EPDS score: 3  Plans for BC:  Tubal ligation PERFORMED AT CS  Last PAP:   Last Completed Pap Smear          PAP SMEAR (Every 3 Years) Next due on 3/30/2025    2022  IgP, Aptima HPV                /72   Pulse 65   Ht 162.6 cm (64.02\")   Wt 72.6 kg (160 lb)   LMP 2022   Breastfeeding Yes   BMI 27.45 kg/m²     Physical Exam  Vitals and nursing note reviewed.   Constitutional:       General: She is not in acute distress.     Appearance: Normal appearance. She is not ill-appearing.   Abdominal:      General: Abdomen is flat. There is no distension.      Palpations: Abdomen is soft. There is no mass.      Tenderness: There is no abdominal tenderness. There is no guarding or rebound.      Hernia: No hernia is present.      Comments: The incision is clean, dry, intact and healing well.  There are no signs of infection.   Musculoskeletal:         General: No swelling.      Right lower leg: No edema.      Left lower leg: No edema.   Skin:     General: Skin is warm and dry.      Findings: No rash.   Neurological:      Mental Status: She is alert and oriented to person, place, and time.   Psychiatric:         Mood and Affect: Mood normal.         Behavior: Behavior normal.         Thought Content: Thought content normal.         Judgment: Judgment normal.           ASSESSMENT AND PLAN:  Diagnoses and all orders for this visit:    1. Postpartum follow-up (Primary)  Assessment & Plan:  Stable postpartum course  Recommended continue prenatal vitamins while breast-feeding  May resume all normal activities including intercourse  Sterilization at time of  delivery for birth control      2.  delivery delivered      Counseling:  All birth control " options reviewed in detail.  R/B/A/SE/E of each wrt pts PMHx and prior BC use  EBENEZER risk w hormonal BC reviewed, S/Sx to watch for discussed and questions answered  May resume intercourse  May resume normal activities  Core strengthening exercises reviewed and recommended  Kegel exercises reviewed and recommended  Ok to return to work/school    Follow Up:  Return for Annual physical.      Dragan Jones MD  11/15/2022

## 2022-11-15 ENCOUNTER — POSTPARTUM VISIT (OUTPATIENT)
Dept: OBSTETRICS AND GYNECOLOGY | Facility: CLINIC | Age: 37
End: 2022-11-15

## 2022-11-15 VITALS
WEIGHT: 160 LBS | HEIGHT: 64 IN | BODY MASS INDEX: 27.31 KG/M2 | DIASTOLIC BLOOD PRESSURE: 72 MMHG | SYSTOLIC BLOOD PRESSURE: 112 MMHG | HEART RATE: 65 BPM

## 2022-11-15 PROBLEM — U07.1 COVID-19 VIRUS DETECTED: Status: RESOLVED | Noted: 2022-06-03 | Resolved: 2022-11-15

## 2022-11-15 PROCEDURE — 0503F POSTPARTUM CARE VISIT: CPT | Performed by: OBSTETRICS & GYNECOLOGY

## 2022-11-15 NOTE — ASSESSMENT & PLAN NOTE
Stable postpartum course  Recommended continue prenatal vitamins while breast-feeding  May resume all normal activities including intercourse  Sterilization at time of  delivery for birth control

## 2023-01-30 ENCOUNTER — TREATMENT (OUTPATIENT)
Dept: PHYSICAL THERAPY | Facility: CLINIC | Age: 38
End: 2023-01-30
Payer: OTHER GOVERNMENT

## 2023-01-30 DIAGNOSIS — R32 URINARY INCONTINENCE IN FEMALE: Primary | ICD-10-CM

## 2023-01-30 DIAGNOSIS — N81.89 PELVIC FLOOR WEAKNESS: ICD-10-CM

## 2023-01-30 PROCEDURE — 97110 THERAPEUTIC EXERCISES: CPT | Performed by: PHYSICAL THERAPIST

## 2023-01-30 PROCEDURE — 97161 PT EVAL LOW COMPLEX 20 MIN: CPT | Performed by: PHYSICAL THERAPIST

## 2023-01-30 NOTE — PROGRESS NOTES
Physical Therapy Initial Evaluation and Plan of Care  [address], [place] [address]      Patient: Lissa Guardado   : 1985  Diagnosis/ICD-10 Code:  Urinary incontinence in female [R32]  Referring practitioner: ANGEL Le  Date of Initial Visit: 2023  Today's Date: 2023  Patient seen for 1 sessions           Subjective Questionnaire: Urinary distress inventory score 6=20-39% limitation      Subjective Evaluation    History of Present Illness  Mechanism of injury: Patient is a 37 yr old female referred to physical therapy with diagnosis of urinary incontinence.  Patient reports has had some urinary incontinence since first birth 7 yrs ago.  Patient recently had another C section 10/12/22.  Patient reports only has leakage with activity/sneeze/cough/working out.  Patient reports mindful of breathing with working out to help with leakage.  Patient states that she does not have to wear daily protection for urine leakage.     Patient Goals  Patient goals for therapy: increased strength  Patient goal: to maintain urinary continence           Objective          Functional Assessment     Comments  Verbal consent obtained for internal pelvic exam/treatment.  Pelvic floor strength 3/5; able to hold 3-4 seconds; minimal co-contraction of transverse abdominal noted  Patient needing cues for lift with squeeze of pelvic floor muscles  No abdominal diastasis noted              Assessment & Plan     Assessment  Impairments: impaired physical strength and lacks appropriate home exercise program  Other impairment: urinary incontinence    Assessment details: Pt presents with limitations, noted by evaluation that impede patient's ability to maintain urinary continence.  The skills of a therapist will be required to safely and effectively implement the following treatment plan to restore maximal level of function.    Prognosis: good    Goals  Plan Goals: 1.Urinary incontinence      LT weeks:The  patient will report 75% decrease in urinary incontinence       Status: New      ST weeks: The patient will report 50% decrease in urinary incontience        Status: New  TREATMENT:  Therapeutic exercise to increase strength and endurance of the pelvic floor, biofeedback as needed to aid in the strengthening process, patient education on extensive HEP, patient education on urge control techniques and pelvic bracing during cough, sneeze, etc.       2.  Pelvic floor weakness   LTG2: 8 weeks: Patient will present with 4/5 strength of the pelvic floor musculature with patient reporting 75% improvement with complaints of urinary stress incontinence  STATUS:  New   STG2a: 4weeks:  Strength of the pelvic floor musculature at 3+/5.   STATUS:  New   Treatment:  Therapeutic exercise to increase strength and endurance of the pelvic floor, biofeedback as needed to aid in the strengthening process, patient education on extensive HEP, patient education on urge control techniques and pelvic bracing.     3. Other PT Primary Functional Limitation     LTG 3: 8 weeks:  The patient will demonstrate 1-19% limitation by achieving a score of 3 on the Urinary distress inventory.    STATUS:  New   STG 3a: 4 weeks:  The patient will demonstrate independence and compliance with home exercise program.     STATUS:  New       Plan  Therapy options: will be seen for skilled therapy services  Planned therapy interventions: abdominal trunk stabilization, strengthening, neuromuscular re-education and home exercise program  Frequency: 1x week  Duration in weeks: 8  Treatment plan discussed with: patient      History # of Personal Factors and/or Comorbidities: LOW (0)  Examination of Body System(s): # of elements: LOW (1-2)  Clinical Presentation: STABLE   Clinical Decision Making: LOW       Visit Diagnoses:    ICD-10-CM ICD-9-CM   1. Urinary incontinence in female  R32 788.30   2. Pelvic floor weakness  N81.89 618.89       Timed:  Manual Therapy:          mins  80912;  Therapeutic Exercise:    12     mins  40491;     Neuromuscular Kaylee:        mins  32479;    Therapeutic Activity:          mins  82793;     Gait Training:           mins  41079;     Ultrasound:          mins  52411;    Electrical Stimulation:         mins  79198 ( );    Untimed:  Electrical Stimulation:         mins  33327 ( );  Mechanical Traction:         mins  67924;    PT evaluation     Low Eval                        30   Mins  50741  Mod Eval                             Mins  70601  High Eval                           Mins  39098    Timed Treatment:   12   mins   Total Treatment:     42   mins    PT SIGNATURE: Shyanne Beach, PT     Electronically singed 1/30/2023    [place] PT license: [id number]        Initial Certification  Certification Period: 1/30/2023 thru 4/29/2023  I certify that the therapy services are furnished while this patient is under my care.  The services outlined above are required by this patient, and will be reviewed every 90 days.    PHYSICIAN: Stacy Lopez APRN  NPI: [id number]                                      DATE:     Please sign and return via fax to [phone]  Thank you, [facility].

## 2023-02-21 ENCOUNTER — TREATMENT (OUTPATIENT)
Dept: PHYSICAL THERAPY | Facility: CLINIC | Age: 38
End: 2023-02-21
Payer: OTHER GOVERNMENT

## 2023-02-21 DIAGNOSIS — R32 URINARY INCONTINENCE IN FEMALE: Primary | ICD-10-CM

## 2023-02-21 DIAGNOSIS — N81.89 PELVIC FLOOR WEAKNESS: ICD-10-CM

## 2023-02-21 PROCEDURE — 97110 THERAPEUTIC EXERCISES: CPT | Performed by: PHYSICAL THERAPIST

## 2023-02-21 NOTE — PROGRESS NOTES
Physical Therapy Daily Treatment Note      Patient: Lissa Guardado   : 1985  Referring practitioner: ANGEL Le  Date of Initial Visit: Type: THERAPY  Noted: 2023  Today's Date: 2023  Patient seen for 2 sessions           Subjective   Lissa Guardado reports: compliance with home exercise program.       Objective   See Exercise, Manual, and Modality Logs for complete treatment.       Assessment & Plan     Assessment    Assessment details: Patient tolerated progression of core/pelvic floor strengthening. Upgraded home exercise program.        Visit Diagnoses:    ICD-10-CM ICD-9-CM   1. Urinary incontinence in female  R32 788.30   2. Pelvic floor weakness  N81.89 618.89       Anticipate DC next Visit           Timed:  Manual Therapy:         mins  03818;  Therapeutic Exercise:    38     mins  45769;     Neuromuscular Kaylee:        mins  62275;    Therapeutic Activity:          mins  92170;     Gait Training:           mins  99737;     Ultrasound:          mins  49390;    Electrical Stimulation:         mins  39474 ( );    Untimed:  Electrical Stimulation:         mins  73215 ( );  Mechanical Traction:         mins  05479;     Timed Treatment:   38   mins   Total Treatment:     38   mins  Shyanne Beach PT    Electronically singed 2023      [place] PT license: [id number]  Physical Therapist

## 2023-05-31 ENCOUNTER — DOCUMENTATION (OUTPATIENT)
Dept: PHYSICAL THERAPY | Facility: CLINIC | Age: 38
End: 2023-05-31

## (undated) DEVICE — SUT MNCRYL 0/0 CTX 36IN Y398H

## (undated) DEVICE — TRY CATH FOL ADVANCE SIL W/BAG 16F

## (undated) DEVICE — NEEDLE,18GX1.5",REG,BEVEL: Brand: MEDLINE

## (undated) DEVICE — INTENDED FOR TISSUE SEPARATION, AND OTHER PROCEDURES THAT REQUIRE A SHARP SURGICAL BLADE TO PUNCTURE OR CUT.: Brand: BARD-PARKER ® CARBON RIB-BACK BLADES

## (undated) DEVICE — VIOLET BRAIDED (POLYGLACTIN 910), SYNTHETIC ABSORBABLE SUTURE: Brand: COATED VICRYL

## (undated) DEVICE — C SECTION PACK: Brand: MEDLINE INDUSTRIES, INC.

## (undated) DEVICE — Device: Brand: PORTEX

## (undated) DEVICE — SUT MNCRYL 0 CT1 27IN VIL

## (undated) DEVICE — STERILE POLYISOPRENE POWDER-FREE SURGICAL GLOVES WITH EMOLLIENT COATING: Brand: PROTEXIS

## (undated) DEVICE — PAD GRND REM POLYHESIVE A/ DISP

## (undated) DEVICE — CVR HNDL LT SURG ACCSSRY BLU STRL

## (undated) DEVICE — GLV SURG BIOGEL LTX PF 7

## (undated) DEVICE — SUT MNCRYL 4/0 PS2 18 IN

## (undated) DEVICE — SUT VIC 3/0 CTI 36IN J944H

## (undated) DEVICE — SUT CHRM 0 CT1 36IN 924H

## (undated) DEVICE — DEV TRANSF BLD W/LUER ADPT CA/198